# Patient Record
Sex: MALE | Race: WHITE | NOT HISPANIC OR LATINO | Employment: OTHER | URBAN - METROPOLITAN AREA
[De-identification: names, ages, dates, MRNs, and addresses within clinical notes are randomized per-mention and may not be internally consistent; named-entity substitution may affect disease eponyms.]

---

## 2019-07-17 NOTE — TELEPHONE ENCOUNTER
FUTURE VISIT INFORMATION      FUTURE VISIT INFORMATION:    Date: 8/27/19    Time: 3:30 pm ENT  2:30 pm Audiology    [place] [facility] [place]ERRAL INFORMATION:    Referring provider:  Dr. Kwadwo Landaverde    Referring providers clinic:  [facility]    Reason for visit/diagnosis  Bilateral hearing loss, hearing aids do not work    RECORDS REQUESTED FROM:       Clinic name Comments Records Status Imaging Status   [facility] Last office visit-6/5/19-Dr. Kwadwo Landaverde  Referral by phone encounter-7/10/19 Care Everywhere          [facility] MRI Head-1/9/14 Report in Care Everywhere PACS   [facility] Office Visit-1/23/19 Hearing aids Care Everywhere                  Action    Action Taken 7/17/2019 -9:05 AM-Faxed request for imaging to [facility].  PB  7/17/2019-1:30pm-Patient needs REBECCA, per [facility].  PB  7/24/19-Called patient and LVM to return call.  [phone]  PB  8/5/19-Pt called.  I emailed an REBECCA to him.  [email or web address]  PB  8/9/2019 -11:09 AM-Received images from [facility]. Archived to PACS PB         Action    Action Taken 7/17/2019-9:09 AM-Faxed request for records to [facility].  PB  7/17/2019 -3:17 PM Received fax from [facility].  They do not have any records of this patient.  PB

## 2019-07-23 ENCOUNTER — DOCUMENTATION ONLY (OUTPATIENT)
Dept: FAMILY MEDICINE | Facility: CLINIC | Age: 74
End: 2019-07-23

## 2019-07-23 NOTE — PROGRESS NOTES
Visit to the client's home for annual health risk assessment.  An  was not needed.    Current situation/living environment  Mike is a 73 year old male who lives with his spouse and adult daughter, as well as her family, in a single family home in [place]. He is a member of the [employer] community, [employer]. 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 He use to go to [facility] back in 2013/2014, but changed clinics to [facility], as finally [facility] near [address]. He came back on [facility] in July 2019, and would like to re-establish at [facility] to keep me as a .    Activities of daily living (ADL)/instrumental activities of daily living (IADL) and functional issues  Client needs help with the following ADL's: None  Client needs help with the following IADL's: housekeeping  Client states he is unable to perform the above due to Chronic knee pain, more in the right than the left. He states that it shoots down his ankle and up to his hip.      Health concerns for today  No major concerns today. He continues to have ongoing bilateral knee pain, and more in the right one. He is due for a Medicare Wellness Visit, and would like to schedule that in September of 2019. I called the clinic to schedule but was not able to, as they did not have their schedules yet. Will have Mike call in August to set up.  Has patient fallen 2 or more times in the last year? No  Has patient fallen with injury in the last year? No    Cognition/mental health  Denies any mental health issues or memory impairment. He has had some stressful events occur in the last year, but reports that he feels stable and has no concerns. He relies on his nichole and prayer.    STARS/Med Adherence  Client is non-compliant with the following quality measures: Colon cancer screening  Comments: education efforts-Discussed FIT testing. Will discuss with MD at  [facility]. Also discussed Advance Directives today and encouraged him to fill one out. Will consider this.    Client's Plan of Care consists of:  No supports needed at this time    Monika Ely, MANNIE  [phone]

## 2019-08-26 DIAGNOSIS — H91.90 HEARING LOSS, UNSPECIFIED HEARING LOSS TYPE, UNSPECIFIED LATERALITY: Primary | ICD-10-CM

## 2019-08-27 ENCOUNTER — OFFICE VISIT (OUTPATIENT)
Dept: AUDIOLOGY | Facility: CLINIC | Age: 74
End: 2019-08-27
Attending: OTOLARYNGOLOGY
Payer: COMMERCIAL

## 2019-08-27 ENCOUNTER — OFFICE VISIT (OUTPATIENT)
Dept: OTOLARYNGOLOGY | Facility: CLINIC | Age: 74
End: 2019-08-27
Payer: COMMERCIAL

## 2019-08-27 ENCOUNTER — PRE VISIT (OUTPATIENT)
Dept: OTOLARYNGOLOGY | Facility: CLINIC | Age: 74
End: 2019-08-27

## 2019-08-27 VITALS
HEART RATE: 54 BPM | WEIGHT: 236 LBS | SYSTOLIC BLOOD PRESSURE: 148 MMHG | BODY MASS INDEX: 31.97 KG/M2 | DIASTOLIC BLOOD PRESSURE: 92 MMHG | HEIGHT: 72 IN

## 2019-08-27 DIAGNOSIS — H90.3 BILATERAL SENSORINEURAL HEARING LOSS: Primary | ICD-10-CM

## 2019-08-27 DIAGNOSIS — H91.90 HEARING LOSS, UNSPECIFIED HEARING LOSS TYPE, UNSPECIFIED LATERALITY: ICD-10-CM

## 2019-08-27 DIAGNOSIS — H90.3 SENSORY HEARING LOSS, BILATERAL: Primary | ICD-10-CM

## 2019-08-27 DIAGNOSIS — H93.13 TINNITUS, BILATERAL: ICD-10-CM

## 2019-08-27 ASSESSMENT — PAIN SCALES - GENERAL: PAINLEVEL: NO PAIN (1)

## 2019-08-27 ASSESSMENT — MIFFLIN-ST. JEOR: SCORE: 1838

## 2019-08-27 NOTE — PATIENT INSTRUCTIONS
1.  You were seen in the ENT Clinic today by Dr. Nissen.  If you have any questions or concerns after your appointment, please call [phone]. Press option #1 for scheduling related needs. Press option #3 for Nurse advice.    2.  Plan is to return to clinic in 3 years with an Audiogram and Tympanogram (hearing test) prior to appointment, or sooner with any concerns.      Laura Foss LPN  [facility]  [phone]

## 2019-08-27 NOTE — PROGRESS NOTES
AUDIOLOGY REPORT    SUMMARY: Audiology visit completed. See audiogram for results.      RECOMMENDATIONS: Follow-up with ENT.    Angi Mabry.  Licensed Audiologist  [place] # [id number]

## 2019-08-27 NOTE — NURSING NOTE
"Chief Complaint   Patient presents with     Consult     bilateral hearing loss, new hearing aids      Blood pressure (!) 148/92, pulse 54, height 1.82 m (5' 11.65\"), weight 107 kg (236 lb).  '  Gino Patino LPN    "

## 2019-08-27 NOTE — LETTER
8/27/2019       RE: Mike Santiago  [address]   [place] [address]     Dear Colleague,    Thank you for referring your patient, Mike Santiago, to the [facility] at [facility]. Please see a copy of my visit note below.    Dear Emmanuel Flores:    I had the pleasure of meeting Mike Santiago in consultation today at the [facility] at your request.    CHIEF COMPLAINT: Hearing loss    HISTORY OF PRESENT ILLNESS: Patient is a 73-year-old in today for assessment of his ears and hearing.  He has had a question of hearing loss and hearing issues for several years.  He obtained his first set of hearing aids probably over 12 years ago.  His current ones are about 7 years old.  They are no longer working.  He feels his hearing is symmetrical.  He has had bilateral tinnitus for many years as well.  He has not had any pain or drainage, no adult ear infections.  Denies any dizziness.  Further denies any dysphagia, hoarseness, facial paresthesias.  He has been around a lot of noise being in the [employer] business, using chain saws, jackhammers, etc.    ALLERGIES:    Allergies   Allergen Reactions     Norfloxacin Dermatitis     Rash     Shrimp        HABITS: Social History    Substance and Sexual Activity      Alcohol use: Yes        Comment: Some     History   Smoking Status     Former Smoker     Packs/day: 0.00     Years: 5.00     Types: Cigarettes     Start date: 1/1/1962     Quit date: 1/1/1966   Smokeless Tobacco     Never Used         PAST MEDICAL HISTORY: Please see today's intake form (for the remainder of the PMH) which I reviewed and signed.  Past Medical History:   Diagnosis Date     Conductive hearing loss      Hearing problem      Tinnitus        FAMILY HISTORY/SOCIAL HISTORY:   Family History   Problem Relation Age of Onset     Asthma Father      Heart Disease Father      Cerebrovascular Disease Mother       Social  History     Socioeconomic History     Marital status:      Spouse name: Not on file     Number of children: Not on file     Years of education: Not on file     Highest education level: Not on file   Occupational History     Not on file   Social Needs     Financial resource strain: Not on file     Food insecurity:     Worry: Not on file     Inability: Not on file     Transportation needs:     Medical: Not on file     Non-medical: Not on file   Tobacco Use     Smoking status: Former Smoker     Packs/day: 0.00     Years: 5.00     Pack years: 0.00     Types: Cigarettes     Start date: 1962     Last attempt to quit: 1966     Years since quittin.6     Smokeless tobacco: Never Used   Substance and Sexual Activity     Alcohol use: Yes     Comment: Some     Drug use: No     Sexual activity: Yes     Partners: Female     Birth control/protection: Other   Lifestyle     Physical activity:     Days per week: Not on file     Minutes per session: Not on file     Stress: Not on file   Relationships     Social connections:     Talks on phone: Not on file     Gets together: Not on file     Attends Gnosticist service: Not on file     Active member of club or organization: Not on file     Attends meetings of clubs or organizations: Not on file     Relationship status: Not on file     Intimate partner violence:     Fear of current or ex partner: Not on file     Emotionally abused: Not on file     Physically abused: Not on file     Forced sexual activity: Not on file   Other Topics Concern     Not on file   Social History Narrative     Not on file       REVIEW OF SYSTEMS: Patient Supplied Answers to Review of Systems  [facility] ROS 2019   Constitutional Problems with sleep   Musculoskeletal Swollen legs/feet   Hematologic Bleeding problems   Endocrine Frequent urination       The remainder of the 10 point ROS is negative    PHYSICIAL EXAMINATION:  Constitutional: The patient was well-groomed and in no acute distress.    Skin: Warm and pink.  Psychiatric: The patient's affect was calm, cooperative, and appropriate.   Respiratory: Breathing comfortably without stridor or exertion of accessory muscles.  Eyes: Pupils were equal and reactive. Extraocular movement intact.   Head: Normocephalic and atraumatic. No lesions or scars.  Ears: Patient placed under the microscope for microscopic evaluation and cleaning of cerumen which was obscuring full visualization of both TMs. Under high power magnification, the right ear was examined and cleaned of cerumen using curet, alligator forceps, and suction.  After cleaning, TM is fully visualized and has normal position with normal middle ear aeration. The left ear was then cleaned and inspected using microscope, instruments and similar techniques. After cleaning of cerumen, the TM has normal position with normal aeration to middle ear.  Nose: Sinuses were nontender. Anterior rhinoscopy revealed midline septum and absence of purulence or polyps.  Oral Cavity: Normal tongue, floor of mouth, buccal mucosa, and palate. No lesions or masses on inspection or palpation. No abnormal lymph tissue in the oropharynx.   Neck: The parotid is soft without masses. Supple with normal laryngeal and tracheal landmarks.   Lymphatic: There is no palpable lymphadenopathy or other masses in the neck.   Neurologic: Alert and oriented x 3. Cranial nerves III-XI within normal limits. Voice quality normal.  Cerebellar Function Tests:  Grossly normal    Audiogram: Audiogram performed shows a severe bilateral high-frequency sensorineural hearing loss above 1000 Hz.  Discrimination 96% on the right and 84% on the left.  Fairly symmetrical hearing.  Normal type A tympanograms bilaterally.      IMPRESSION AND PLAN:   1. Bilateral sensorineural hearing loss: Discussed protecting his ears from any further noise exposure.  Hearing aids discussed which he will pursue at his discretion in place of location.  Recommend follow-up  in 2 years to monitor, sooner if any significant change or concerns.  2. Bilateral tinnitus: Secondary to sensorineural hearing loss, no treatment needed, monitor.    Thank you very much for the opportunity to participate in the care of your patient.    Rick L Nissen MD

## 2019-08-27 NOTE — PROGRESS NOTES
Dear Emmanuel Flores:    I had the pleasure of meeting Mike Santiago in consultation today at the [facility] at your request.    CHIEF COMPLAINT: Hearing loss    HISTORY OF PRESENT ILLNESS: Patient is a 73-year-old in today for assessment of his ears and hearing.  He has had a question of hearing loss and hearing issues for several years.  He obtained his first set of hearing aids probably over 12 years ago.  His current ones are about 7 years old.  They are no longer working.  He feels his hearing is symmetrical.  He has had bilateral tinnitus for many years as well.  He has not had any pain or drainage, no adult ear infections.  Denies any dizziness.  Further denies any dysphagia, hoarseness, facial paresthesias.  He has been around a lot of noise being in the [employer] business, using chain saws, jackhammers, etc.    ALLERGIES:    Allergies   Allergen Reactions     Norfloxacin Dermatitis     Rash     Shrimp        HABITS: Social History    Substance and Sexual Activity      Alcohol use: Yes        Comment: Some     History   Smoking Status     Former Smoker     Packs/day: 0.00     Years: 5.00     Types: Cigarettes     Start date: 1/1/1962     Quit date: 1/1/1966   Smokeless Tobacco     Never Used         PAST MEDICAL HISTORY: Please see today's intake form (for the remainder of the PMH) which I reviewed and signed.  Past Medical History:   Diagnosis Date     Conductive hearing loss      Hearing problem      Tinnitus        FAMILY HISTORY/SOCIAL HISTORY:   Family History   Problem Relation Age of Onset     Asthma Father      Heart Disease Father      Cerebrovascular Disease Mother       Social History     Socioeconomic History     Marital status:      Spouse name: Not on file     Number of children: Not on file     Years of education: Not on file     Highest education level: Not on file   Occupational History     Not on file   Social Needs     Financial resource strain:  Not on file     Food insecurity:     Worry: Not on file     Inability: Not on file     Transportation needs:     Medical: Not on file     Non-medical: Not on file   Tobacco Use     Smoking status: Former Smoker     Packs/day: 0.00     Years: 5.00     Pack years: 0.00     Types: Cigarettes     Start date: 1962     Last attempt to quit: 1966     Years since quittin.6     Smokeless tobacco: Never Used   Substance and Sexual Activity     Alcohol use: Yes     Comment: Some     Drug use: No     Sexual activity: Yes     Partners: Female     Birth control/protection: Other   Lifestyle     Physical activity:     Days per week: Not on file     Minutes per session: Not on file     Stress: Not on file   Relationships     Social connections:     Talks on phone: Not on file     Gets together: Not on file     Attends Rastafari service: Not on file     Active member of club or organization: Not on file     Attends meetings of clubs or organizations: Not on file     Relationship status: Not on file     Intimate partner violence:     Fear of current or ex partner: Not on file     Emotionally abused: Not on file     Physically abused: Not on file     Forced sexual activity: Not on file   Other Topics Concern     Not on file   Social History Narrative     Not on file       REVIEW OF SYSTEMS: Patient Supplied Answers to Review of Systems   ENT ROS 2019   Constitutional Problems with sleep   Musculoskeletal Swollen legs/feet   Hematologic Bleeding problems   Endocrine Frequent urination       The remainder of the 10 point ROS is negative    PHYSICIAL EXAMINATION:  Constitutional: The patient was well-groomed and in no acute distress.   Skin: Warm and pink.  Psychiatric: The patient's affect was calm, cooperative, and appropriate.   Respiratory: Breathing comfortably without stridor or exertion of accessory muscles.  Eyes: Pupils were equal and reactive. Extraocular movement intact.   Head: Normocephalic and  atraumatic. No lesions or scars.  Ears: Patient placed under the microscope for microscopic evaluation and cleaning of cerumen which was obscuring full visualization of both TMs. Under high power magnification, the right ear was examined and cleaned of cerumen using curet, alligator forceps, and suction.  After cleaning, TM is fully visualized and has normal position with normal middle ear aeration. The left ear was then cleaned and inspected using microscope, instruments and similar techniques. After cleaning of cerumen, the TM has normal position with normal aeration to middle ear.  Nose: Sinuses were nontender. Anterior rhinoscopy revealed midline septum and absence of purulence or polyps.  Oral Cavity: Normal tongue, floor of mouth, buccal mucosa, and palate. No lesions or masses on inspection or palpation. No abnormal lymph tissue in the oropharynx.   Neck: The parotid is soft without masses. Supple with normal laryngeal and tracheal landmarks.   Lymphatic: There is no palpable lymphadenopathy or other masses in the neck.   Neurologic: Alert and oriented x 3. Cranial nerves III-XI within normal limits. Voice quality normal.  Cerebellar Function Tests:  Grossly normal    Audiogram: Audiogram performed shows a severe bilateral high-frequency sensorineural hearing loss above 1000 Hz.  Discrimination 96% on the right and 84% on the left.  Fairly symmetrical hearing.  Normal type A tympanograms bilaterally.      IMPRESSION AND PLAN:   1. Bilateral sensorineural hearing loss: Discussed protecting his ears from any further noise exposure.  Hearing aids discussed which he will pursue at his discretion in place of location.  Recommend follow-up in 2 years to monitor, sooner if any significant change or concerns.  2. Bilateral tinnitus: Secondary to sensorineural hearing loss, no treatment needed, monitor.    Thank you very much for the opportunity to participate in the care of your patient.    Rick L Nissen MD

## 2019-09-25 ENCOUNTER — OFFICE VISIT (OUTPATIENT)
Dept: AUDIOLOGY | Facility: CLINIC | Age: 74
End: 2019-09-25
Attending: OTOLARYNGOLOGY
Payer: COMMERCIAL

## 2019-09-25 DIAGNOSIS — H90.3 BILATERAL SENSORINEURAL HEARING LOSS: ICD-10-CM

## 2019-09-25 DIAGNOSIS — H93.13 TINNITUS, BILATERAL: ICD-10-CM

## 2019-09-25 NOTE — PROGRESS NOTES
AUDIOLOGY REPORT    SUBJECTIVE: Mike Santiago is a 73 year old male who was seen in the Audiology Clinic at the [facility] on 9/25/2019 to discuss concerns with hearing and functional communication difficulties. The patient has been seen previously on 8/27/2019, and results revealed mild sensorineural hearing loss rising to normal hearing sloping to profound sensorineural hearing loss for the right ear and mild sloping to profound sensorineural hearing loss for the left ear. He was medically evaluated and determined to be cleared for bilateral hearing aids by Rick Nissen, M.D. Mike notes difficulty with communication in a variety of listening situations.    OBJECTIVE:  Abuse Screening:  Do you feel unsafe at home or work/school? No  Do you feel threatened by someone? No  Does anyone try to keep you from having contact with others, or doing things outside of your home? No  Physical signs of abuse present? No    The patient is a hearing aid candidate. The patient would like to move forward with a hearing aid evaluation today. Therefore, the patient was presented with different options for amplification to help aid in communication. Discussed styles, levels of technology, iPhone compatibility, and rechargeable options.     The hearing aids mutually chosen were:  BILATERAL: Signia Pure 3Nx Charge and Go  COLOR: Keysha brown  EARMOLD/TIPS: Large closed sleeve domes  CANAL/ LENGTH: 1    ASSESSMENT: Reviewed purchase information and warranty information with the patient. The 45 day trial period was explained to the patient. The patient was given a copy of the [place] Department of Health consumer brochure on purchasing hearing instruments. Patient risk factors have been provided to the patient in writing prior to the sale of the hearing aid per FDA regulation. The risk factors are also available in the User Instructional Booklet to be presented on the day of the  hearing aid fitting. Hearing aids ordered. Hearing aid evaluation completed.    PLAN: Mike is scheduled to return in 2-3 weeks for a hearing aid fitting and programming. Purchase agreement will be completed on that date. Please contact this clinic with any questions or concerns.      Zeus Denton, [facility]  Licensed Audiologist  [place] #[id number]

## 2019-10-29 ENCOUNTER — TRANSFERRED RECORDS (OUTPATIENT)
Dept: HEALTH INFORMATION MANAGEMENT | Facility: CLINIC | Age: 74
End: 2019-10-29

## 2019-11-04 ENCOUNTER — OFFICE VISIT (OUTPATIENT)
Dept: AUDIOLOGY | Facility: CLINIC | Age: 74
End: 2019-11-04
Payer: COMMERCIAL

## 2019-11-04 DIAGNOSIS — H90.3 BILATERAL SENSORINEURAL HEARING LOSS: Primary | ICD-10-CM

## 2019-11-04 NOTE — PROGRESS NOTES
AUDIOLOGY REPORT    SUBJECTIVE: Mike Santiago is a 74 year old male who was seen in the Audiology Clinic at the [facility] for a fitting of binaural Signia Pure Charge and Go 3NX.  The patient has been seen previously on 8/27/2019, and results revealed mild sensorineural hearing loss rising to normal hearing sloping to profound sensorineural hearing loss for the right ear and mild sloping to profound sensorineural hearing loss for the left ear.The patient was given medical clearance to pursue amplification by  Rick Nissen, MD..  OBJECTIVE: The hearing aid conformity evaluation was completed.The hearing aids were placed and they provided a good fit. Real-ear-probe-microphone measurements were completed on the Voxxter system and were a good match to NAL-NL1 target with soft sounds audible, moderate sounds comfortable, and loud sounds below discomfort. UCLs are verified through maximum power output measures and demonstrate appropriate limiting of loud inputs. Mike was oriented to proper hearing aid use, care, cleaning (no water, dry brush), batteries (size RC, insertion/removal, toxicity, low-battery signal), aid insertion/removal, user booklet, warranty information, storage cases, and other hearing aid details. The patient confirmed understanding of hearing aid use and care, and showed proper insertion of hearing aid and batteries while in the office today.Mike reported good volume and sound quality today.   Hearing aids were programmed as follows:  Program 1:Universal  Program 2: Noise    EAR(S) FIT: Bilateral  HEARING AID MODEL NAME: Signia Pure Charge and Go 3NX  HEARING AID STYLE: -in-the-ear behind-the-ear  EARMOLDS/TIP: Large vented sleeve domes  SERIAL NUMBERS: Right: [id number] Left: [id number]  WARRANTY END DATE: 11/23/2022    ASSESSMENT: Binaural Signia Pure Charge and Go 3NX hearing aids were fit today. Verification measures were performed. Mike signed the Hearing Aid  Purchase Agreement and was given a copy, as well as details on his hearing aids. Patient was counseled that exact out of pocket amounts cannot be determined for hearing aid claims being sent to insurance. Any insurance coverage information presented to the patient is an estimate only, and is not a guarantee of payment. Patient has been advised to check with their own insurance.  His insurance will be billed.    PLAN:Mike will return for follow-up in 2-3 weeks for a hearing aid review appointment. Please call this clinic with questions regarding today s appointment.      Angi Marc., CCC-A  Licensed Audiologist  [place] #[id number]

## 2019-11-05 ENCOUNTER — HEALTH MAINTENANCE LETTER (OUTPATIENT)
Age: 74
End: 2019-11-05

## 2019-11-18 ENCOUNTER — OFFICE VISIT (OUTPATIENT)
Dept: FAMILY MEDICINE | Facility: CLINIC | Age: 74
End: 2019-11-18
Payer: COMMERCIAL

## 2019-11-18 ENCOUNTER — RECORDS - HEALTHEAST (OUTPATIENT)
Dept: ADMINISTRATIVE | Facility: OTHER | Age: 74
End: 2019-11-18

## 2019-11-18 VITALS
SYSTOLIC BLOOD PRESSURE: 129 MMHG | WEIGHT: 237 LBS | RESPIRATION RATE: 12 BRPM | HEIGHT: 72 IN | HEART RATE: 64 BPM | TEMPERATURE: 97.3 F | OXYGEN SATURATION: 98 % | DIASTOLIC BLOOD PRESSURE: 82 MMHG | BODY MASS INDEX: 32.1 KG/M2

## 2019-11-18 DIAGNOSIS — B00.1 COLD SORE: ICD-10-CM

## 2019-11-18 DIAGNOSIS — N18.30 CKD (CHRONIC KIDNEY DISEASE) STAGE 3, GFR 30-59 ML/MIN (H): ICD-10-CM

## 2019-11-18 DIAGNOSIS — E78.5 HYPERLIPIDEMIA LDL GOAL <130: ICD-10-CM

## 2019-11-18 DIAGNOSIS — K59.00 CONSTIPATION, UNSPECIFIED CONSTIPATION TYPE: Primary | ICD-10-CM

## 2019-11-18 DIAGNOSIS — I26.99 ACUTE PULMONARY EMBOLISM WITHOUT ACUTE COR PULMONALE, UNSPECIFIED PULMONARY EMBOLISM TYPE (H): ICD-10-CM

## 2019-11-18 DIAGNOSIS — K40.91 UNILATERAL RECURRENT INGUINAL HERNIA WITHOUT OBSTRUCTION OR GANGRENE: ICD-10-CM

## 2019-11-18 DIAGNOSIS — R10.31 RLQ ABDOMINAL PAIN: ICD-10-CM

## 2019-11-18 LAB
ALBUMIN SERPL-MCNC: 3.8 MG/DL (ref 3.3–4.9)
ALP SERPL-CCNC: 61.8 U/L (ref 40–150)
ALT SERPL-CCNC: <15 U/L (ref 0–45)
AST SERPL-CCNC: 21.4 U/L (ref 0–55)
BILIRUB SERPL-MCNC: 0.5 MG/DL (ref 0.2–1.3)
BUN SERPL-MCNC: 17.8 MG/DL (ref 7–21)
CALCIUM SERPL-MCNC: 8.9 MG/DL (ref 8.5–10.1)
CHLORIDE SERPLBLD-SCNC: 110.7 MMOL/L (ref 98–110)
CHOLEST SERPL-MCNC: 226.4 MG/DL (ref 0–200)
CHOLEST/HDLC SERPL: 4.4 {RATIO} (ref 0–5)
CO2 SERPL-SCNC: 29.1 MMOL/L (ref 20–32)
CREAT SERPL-MCNC: 1.2 MG/DL (ref 0.7–1.3)
CREAT UR-MCNC: 176.9 MG/DL
GFR SERPL CREATININE-BSD FRML MDRD: 59.8 ML/MIN/1.7 M2
GLUCOSE SERPL-MCNC: 81.9 MG'DL (ref 70–99)
HDLC SERPL-MCNC: 51.1 MG/DL
LDLC SERPL CALC-MCNC: 153 MG/DL (ref 0–129)
MICROALBUMIN UR-MCNC: 0.56 MG/DL (ref 0–1.99)
MICROALBUMIN/CREAT UR: 3.2 MG/G
POTASSIUM SERPL-SCNC: 4.6 MMOL/DL (ref 3.2–4.6)
PROT SERPL-MCNC: 6.4 G/DL (ref 6.8–8.8)
SODIUM SERPL-SCNC: 143.7 MMOL/L (ref 132–142)
TRIGL SERPL-MCNC: 111.9 MG/DL (ref 0–150)
VLDL CHOLESTEROL: 22.4 MG/DL (ref 7–32)

## 2019-11-18 RX ORDER — POLYETHYLENE GLYCOL 3350 17 G/17G
1 POWDER, FOR SOLUTION ORAL DAILY
Qty: 30 PACKET | Refills: 3 | Status: SHIPPED | OUTPATIENT
Start: 2019-11-18 | End: 2024-01-23

## 2019-11-18 RX ORDER — VALACYCLOVIR HYDROCHLORIDE 1 G/1
2000 TABLET, FILM COATED ORAL 2 TIMES DAILY
Qty: 4 TABLET | Refills: 11 | Status: SHIPPED | OUTPATIENT
Start: 2019-11-18 | End: 2024-01-23

## 2019-11-18 ASSESSMENT — MIFFLIN-ST. JEOR: SCORE: 1835.08

## 2019-11-18 NOTE — PROGRESS NOTES
"This is a 74-year-old male who attends today to reestablish care apparently due to changes in his insurance.  He was last seen at this clinic in 2014.  Since then he has been attending within the [facility] system most recently with a visit in June 2019.  He brings with him a gift card to be signed when he has a health maintenance exam however did not schedule sufficient time for this today.  We have negotiated that we will address acute concerns today and have him follow-up for a longer health maintenance visit.  He is in agreement with that plan.  He is complaining of constipation over the last few weeks.  This is somewhat out of the blue and not a problem he usually has.  He has not noticed any blood in his stools or mucus in the stools.  He has taken some Dulcolax occasionally.  In addition he is aware of a swelling in his right groin which is been going on for about the same amount of time.  He has a history of bilateral inguinal hernia repair many years ago.  He also apparently had an umbilical hernia repaired and feels that this also recurs at times.    Otherwise review of his past medical history reveals that in 2016 he had bilateral pulmonary emboli for which he was started on rivaroxaban.  He has had no recurrence of clots on the anticoagulant.  He also stopped taking pravastatin because some people told him this might harm him.    ROS:  ENT: He is just been recently fitted with hearing aids  Mouth: He had a cold sore on his lower lip for about 4 days which he is treating with a home remedy but for which he would like a medication.    Objective:  /82 (BP Location: Left arm, Patient Position: Sitting, Cuff Size: Adult Large)   Pulse 64   Temp 97.3  F (36.3  C) (Oral)   Resp 12   Ht 1.816 m (5' 11.5\")   Wt 107.5 kg (237 lb)   SpO2 98%   BMI 32.59 kg/m    His blood pressure is satisfactory, he is in no acute distress.  He does have a simple cold sore on his lower lip.  Abdominal exam reveals no " abdominal tenderness nor obvious organomegaly.  He is a scar at the umbilicus and no obvious hernia to my exam today.  He has bilateral inguinal scars.  He has an obvious swelling at the right inguinal region and exam suggests a recurrence of a right inguinal hernia.    His past medical records were reviewed and abstracted to the current record.    Results for orders placed or performed in visit on 11/18/19   Lipid Panel ([facility])     Status: Abnormal   Result Value Ref Range    Cholesterol 226.4 (H) 0.0 - 200.0 mg/dL    Cholesterol/HDL Ratio 4.4 0.0 - 5.0    HDL Cholesterol 51.1 >40.0 mg/dL    LDL Cholesterol Calculated 153 (H) 0 - 129 mg/dL    Triglycerides 111.9 0.0 - 150.0 mg/dL    VLDL Cholesterol 22.4 7.0 - 32.0 mg/dL   Comprehensive Metabolic Panel ([place])     Status: Abnormal   Result Value Ref Range    Albumin 3.8 3.3 - 4.9 mg/dL    Alkaline Phosphatase 61.8 40.0 - 150.0 U/L    ALT <15 0.0 - 45.0 U/L    AST 21.4 0.0 - 55.0 U/L    Bilirubin Total 0.5 0.2 - 1.3 mg/dL    Urea Nitrogen 17.8 7.0 - 21.0 mg/dL    Calcium 8.9 8.5 - 10.1 mg/dL    Chloride 110.7 (H) 98.0 - 110.0 mmol/L    Carbon Dioxide 29.1 20.0 - 32.0 mmol/L    Creatinine 1.2 0.7 - 1.3 mg/dL    Glucose 81.9 70.0 - 99.0 mg'dL    Potassium 4.6 3.2 - 4.6 mmol/dL    Sodium 143.7 (H) 132.0 - 142.0 mmol/L    Protein Total 6.4 (L) 6.8 - 8.8 g/dL    GFR Estimate 59.8 (L) >60.0 mL/min/1.7 m2    GFR Estimate If Black 72.4 >60.0 mL/min/1.7 m2   Microalbumin Creatinine Ratio Random Ur ([facility])     Status: None   Result Value Ref Range    Microalbumin, Urine 0.56 0.00 - 1.99 mg/dL    Creatinine, Urine 176.9 mg/dL    Albumin Urine mg/g Cr 3.2 <=19.9 mg/g         Mike was seen today for new patient.    Diagnoses and all orders for this visit:    Constipation, unspecified constipation type  -     polyethylene glycol (MIRALAX/GLYCOLAX) packet; Take 17 g by mouth daily  -     Comprehensive Metabolic Panel ([place])    RLQ abdominal pain    Unilateral  recurrent inguinal hernia without obstruction or gangrene  -     GENERAL SURG ADULT REFERRAL; Future    Cold sore  -     valACYclovir (VALTREX) 1000 mg tablet; Take 2 tablets (2,000 mg) by mouth 2 times daily for 1 day    Hyperlipidemia LDL goal <130  -     Lipid Panel ([facility])    CKD (chronic kidney disease) stage 3, GFR 30-59 ml/min (H)  -     Comprehensive Metabolic Panel ([place])  -     Microalbumin Creatinine Ratio Random Ur ([facility])    Acute pulmonary embolism without acute cor pulmonale, unspecified pulmonary embolism type (H)      He appears to have a recurrence of a right inguinal hernia.  The chronology may explain the recent constipation also.  I recommended a referral to general surgery.  In addition I prescribed MiraLAX for as needed daily use.    I prescribed valacyclovir for cold sore.    He will schedule a physical for within the next 2 weeks and in advance of that will obtain labs today which we can address at that point.  I have explained that given his risk factors we will be recommending he recommence a statin.    Total visit time was 30 mins, all of which was face to face MD time, and over 50% of this time was spent in counseling and coordination of care.

## 2019-11-18 NOTE — LETTER
November 20, 2019      Mike Santiago  [address]   [place] [address]        Dear Mike,    Your labs show:   No protein loss in urine - good   Liver function normal - good   Borderline kidney function - needs to be monitored   High bad cholesterol LDL - the calculation below estimates that you have a risk of 1 in 4 of having a heart attack in the next 10 years.  Therefore all efforts to lower your risk are recommended - these include keeping your blood pressure low (it was), taking a blood thinner (you are) and lowering your cholesterol.  So we would strongly recommend that you start taking a statin medication again.  Happy to discuss further next time I see you - regards.    The 10-year ASCVD risk score (Mary MARCIAL Jr., et al., 2013) is: 27.3%     Values used to calculate the score:       Age: 74 years       Sex: Male       Is Non- : No       Diabetic: No       Tobacco smoker: No       Systolic Blood Pressure: 129 mmHg       Is BP treated: No       HDL Cholesterol: 51.1 mg/dL       Total Cholesterol: 226.4 mg/dL   Please see below for your test results.    Resulted Orders   Lipid Panel ([facility])   Result Value Ref Range    Cholesterol 226.4 (H) 0.0 - 200.0 mg/dL    Cholesterol/HDL Ratio 4.4 0.0 - 5.0    HDL Cholesterol 51.1 >40.0 mg/dL    LDL Cholesterol Calculated 153 (H) 0 - 129 mg/dL    Triglycerides 111.9 0.0 - 150.0 mg/dL    VLDL Cholesterol 22.4 7.0 - 32.0 mg/dL   Comprehensive Metabolic Panel ([place])   Result Value Ref Range    Albumin 3.8 3.3 - 4.9 mg/dL    Alkaline Phosphatase 61.8 40.0 - 150.0 U/L    ALT <15 0.0 - 45.0 U/L    AST 21.4 0.0 - 55.0 U/L    Bilirubin Total 0.5 0.2 - 1.3 mg/dL    Urea Nitrogen 17.8 7.0 - 21.0 mg/dL    Calcium 8.9 8.5 - 10.1 mg/dL    Chloride 110.7 (H) 98.0 - 110.0 mmol/L    Carbon Dioxide 29.1 20.0 - 32.0 mmol/L    Creatinine 1.2 0.7 - 1.3 mg/dL    Glucose 81.9 70.0 - 99.0 mg'dL    Potassium 4.6 3.2 - 4.6 mmol/dL    Sodium 143.7 (H) 132.0 -  142.0 mmol/L    Protein Total 6.4 (L) 6.8 - 8.8 g/dL    GFR Estimate 59.8 (L) >60.0 mL/min/1.7 m2    GFR Estimate If Black 72.4 >60.0 mL/min/1.7 m2   Microalbumin Creatinine Ratio Random Ur ([facility])   Result Value Ref Range    Microalbumin, Urine 0.56 0.00 - 1.99 mg/dL    Creatinine, Urine 176.9 mg/dL    Albumin Urine mg/g Cr 3.2 <=19.9 mg/g    Narrative    Test performed by:  [facility]  [address] [place] [address]  Microalbumin, Random Urine  <2.0 mg/dL . . . . . . . . Normal  3.0-30.0 mg/dL . . . . . . Microalbuminuria  >30.0 mg/dL . . . . . .  . Clinical Proteinuria  Microalbumin/Creatinine Ratio, Random Urine  <20 mg/g . . . . .. . . . Normal   mg/g . . . . . . . Microalbuminuria  >300 mg/g . . . . . . . . Clinical Proteinuria       If you have any questions, please call the clinic to make an appointment.    Sincerely,    Mike Samano MD

## 2019-11-19 NOTE — RESULT ENCOUNTER NOTE
Hello Mr Santiaog, your labs show:  No protein loss in urine - good  Liver function normal - good  Borderline kidney function - needs to be monitored  High bad cholesterol LDL - the calculation below estimates that you have a risk of 1 in 4 of having a heart attack in the next 10 years.  Therefore all efforts to lower your risk are recommended - these include keeping your blood pressure low (it was), taking a blood thinner (you are) and lowering your cholesterol.  So we would strongly recommend that you start taking a statin medication again.  Happy to discuss further next time I see you - regards, Mike Samano MD    The 10-year ASCVD risk score (Mary MARCIAL Jr., et al., 2013) is: 27.3%    Values used to calculate the score:      Age: 74 years      Sex: Male      Is Non- : No      Diabetic: No      Tobacco smoker: No      Systolic Blood Pressure: 129 mmHg      Is BP treated: No      HDL Cholesterol: 51.1 mg/dL      Total Cholesterol: 226.4 mg/dL

## 2019-11-19 NOTE — PATIENT INSTRUCTIONS
[facility]   Phone: [phone]  Fax: [phone]     Referral, demographics and office visit have been faxed to [phone], they will contact patient to schedule.    Lucie Botello

## 2019-11-20 ENCOUNTER — AMBULATORY - HEALTHEAST (OUTPATIENT)
Dept: SURGERY | Facility: CLINIC | Age: 74
End: 2019-11-20

## 2019-11-20 DIAGNOSIS — K40.91 UNILATERAL RECURRENT INGUINAL HERNIA WITHOUT OBSTRUCTION OR GANGRENE: ICD-10-CM

## 2019-11-22 NOTE — PROGRESS NOTES
AUDIOLOGY REPORT    SUBJECTIVE:Mike Santiago is a 74 year old male who was seen in the Audiology Clinic at the [facility] on 11/25/2019  for a follow-up check regarding the fitting of new hearing aids. The patient has been seen previously on 8/27/2019, and results revealed mild sensorineural hearing loss rising to normal hearing sloping to profound sensorineural hearing loss for the right ear and mild sloping to profound sensorineural hearing loss for the left ear. The patient has been seen previously in this clinic and was fit with  binaural Signia Pure Charge and Go 3NX on 11/4/2019.  Mike reports good sound quality with the hearig aids.   He doesn't use them when he is working with loud noise. 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     Based on patient report, the following changes were made: He was given a third program that is quieter.    Reviewed 45 day trial period, care, cleaning (no water, dry brush), batteries (size RC) insertion/removal, toxicity, low-battery signal), aid insertion/removal, volume adjustment (if applicable), user booklet, warranty information, storage cases, and other hearing aid details.  The Placelingia bailey was downloaded, and his hearing aids were paired to his IPhone     No charge visit today (in warranty hearing aid check).     ASSESSMENT: A follow-up appointment for hearing aid fitting was completed today. IOI-HA administered today. Changes to hearing aid was completed as outlined above.     PLAN:Mike will return for follow-up as needed, or at least every 4-6 months for cleaning and  assessment of hearing aid.  . Please call this clinic with any questions regarding today s appointment.    Zeus Marc, CCC-A  Licensed Audiologist  [place] #[id number]

## 2019-11-25 ENCOUNTER — OFFICE VISIT (OUTPATIENT)
Dept: FAMILY MEDICINE | Facility: CLINIC | Age: 74
End: 2019-11-25
Payer: COMMERCIAL

## 2019-11-25 ENCOUNTER — OFFICE VISIT (OUTPATIENT)
Dept: AUDIOLOGY | Facility: CLINIC | Age: 74
End: 2019-11-25
Payer: COMMERCIAL

## 2019-11-25 VITALS
OXYGEN SATURATION: 97 % | HEIGHT: 72 IN | TEMPERATURE: 98 F | RESPIRATION RATE: 12 BRPM | WEIGHT: 236 LBS | SYSTOLIC BLOOD PRESSURE: 127 MMHG | HEART RATE: 63 BPM | BODY MASS INDEX: 31.97 KG/M2 | DIASTOLIC BLOOD PRESSURE: 83 MMHG

## 2019-11-25 DIAGNOSIS — I26.99 ACUTE PULMONARY EMBOLISM WITHOUT ACUTE COR PULMONALE, UNSPECIFIED PULMONARY EMBOLISM TYPE (H): ICD-10-CM

## 2019-11-25 DIAGNOSIS — D68.2 FACTOR II DEFICIENCY (H): ICD-10-CM

## 2019-11-25 DIAGNOSIS — E78.5 HYPERLIPIDEMIA LDL GOAL <130: ICD-10-CM

## 2019-11-25 DIAGNOSIS — H90.3 BILATERAL SENSORINEURAL HEARING LOSS: Primary | ICD-10-CM

## 2019-11-25 DIAGNOSIS — Z01.818 PREOP GENERAL PHYSICAL EXAM: Primary | ICD-10-CM

## 2019-11-25 LAB
HCT VFR BLD AUTO: 43.9 % (ref 40–53)
HEMOGLOBIN: 14.3 G/DL (ref 13.3–17.7)
MCH RBC QN AUTO: 32.1 PG (ref 26.5–35)
MCHC RBC AUTO-ENTMCNC: 32.6 G/DL (ref 32–36)
MCV RBC AUTO: 98.6 FL (ref 78–100)
PLATELET # BLD AUTO: 272 K/UL (ref 150–450)
RBC # BLD AUTO: 4.5 M/UL (ref 4.4–5.9)
WBC # BLD AUTO: 6.9 K/UL (ref 4–11)

## 2019-11-25 RX ORDER — PRAVASTATIN SODIUM 10 MG
10 TABLET ORAL AT BEDTIME
Qty: 90 TABLET | Refills: 3 | Status: SHIPPED | OUTPATIENT
Start: 2019-11-25 | End: 2020-12-04

## 2019-11-25 ASSESSMENT — MIFFLIN-ST. JEOR: SCORE: 1832.93

## 2019-11-25 NOTE — PATIENT INSTRUCTIONS
PREOP performed.    When surgery is scheduled, STOP XARELTO 24 hours before surgery.  If no complications, RESTART 24 HOURS after surgery.      Personalized Prevention Plan  You are due for the preventive services outlined below.  Your care team is available to assist you in scheduling these services.  If you have already completed any of these items, please share that information with your care team to update in your medical record.  Health Maintenance Due   Topic Date Due     Discuss Advance Care Planning  1945     Zoster (Shingles) Vaccine (1 of 2) 10/30/1993     Annual Wellness Visit  10/30/2008     Pneumococcal Vaccine (2 of 2 - PCV13) 08/20/2013     Flu Vaccine (1) 09/01/2019     MEDICARE PERSONAL PREVENTIVE SERVICES PLAN - IMMUNIZATIONS     Here are your recommended immunizations.  Take this home for your reference.                                                    IMMUNIZATIONS Description Recommend today?     Influenza (Flu shot) Prevents flu; should get every year Recommeded and patient declined.    PCV 13 Pneumonia vaccination; you get it once Recommeded and patient declined.    PPSV 23 Second pneumonia vaccination; usually get it 1 year after PCV 13 No; is up to date.   Zoster (Shingles) Prevents shingles; you get it once  (Check with Part D insurance for coverage, must receive at a pharmacy, not clinic) Recommended and patient is informed of where to get   Tetanus Prevents tetanus; once every 10 years No: is not indicated today       Presurgery Checklist  You are scheduled to have surgery. The healthcare staff will try to make your stay comfortable. Use the guidelines below to remind yourself what to do before surgery. Be sure to follow any specific pre-op instructions from your surgeon or nurse.   Preparing for Surgery  Ask your surgeon if you ll need a blood transfusion during surgery and if so, how to prepare for it. In some cases, you can donate blood before surgery. If needed, this blood can  be given back (transfused) to you during or after surgery.  If you are having abdominal surgery, ask what you need to do to clear your bowel.  Tell your surgeon if you have allergies to any medications or foods.  Arrange for an adult family member or friend to drive you home after surgery. If possible, have someone ready to help you at home as you recover.  Call the surgeon if you get a cold, fever, sore throat, diarrhea, or other health problem just before surgery. Your surgeon can decide whether or not to postpone the surgery.  Medications  Tell your surgeon about all medications you take, including prescription and over-the-counter products such as herbal remedies and vitamins. Ask if you should continue taking them.  If you take ibuprofen, naproxen, or  blood thinners  such as aspirin, clopidogrel (Plavix), or warfarin (Coumadin), ask your surgeon whether you should stop taking them and how long before surgery you should stop.  You may be told to take antibiotics just before surgery to prevent infection. If so, follow instructions carefully on how to take them.  If you are told to take medications called anticoagulants to prevent blood clots after surgery, be sure to follow the instructions on how to take them.  Stop Smoking  If you smoke, healing may take longer. So at least 2 week(s) before surgery, stop smoking.  Bathing or Showering Before Surgery  If instructed, wash with antibacterial soap. Afterward, do not use lotions or powders.  If you are having surgery on the head, you may be asked to shampoo with antibacterial soap. Follow instructions for doing so.  Do Not Remove Hair from the Surgery Site  Do not shave hair from the incision site, unless you are given specific instructions to do so. Usually, if hair needs to be removed, it will be done at the hospital right before surgery.  Don t Eat or Drink  Your doctor will tell you when to stop eating and drinking. If you do not follow your doctor's  instructions, your procedure may be postponed or rescheduled for another day.  If your surgeon tells you to continue any medications, take them with small sips of water.  You can brush your teeth and rinse your mouth, but don t swallow any water.  Day of Surgery  Do not wear makeup. Do not use perfume, deodorant, or hairspray. Remove nail polish and artificial nails.  Leave jewelry (including rings), watches, and other valuables at home.  Be sure to bring health insurance cards or forms and a photo ID.  Bring a list of your medications (include the name, dose, how often you take them, and the time last dose was taken).  Arrive on time at the hospital or surgery facility.

## 2019-11-25 NOTE — NURSING NOTE
VISION:  Vision: Both eye 10/12.5, Right eye 10/16, Left eye 10/16, with no corrective lens    Rula Calixto CMA

## 2019-11-25 NOTE — PROGRESS NOTES
[facility] [address] [place] [address]  Phone: [phone]  Fax: [phone]    11/25/2019    Adult PRE-OP Evaluation:    Mike Santiago, 1945 presents for pre-operative evaluation and assessment as requested by Dr. Whtie Surgery, prior to undergoing surgery/procedure for treatment of  Recurrence of Right Inguinal Hernia .    Proposed procedure: Right Hernia Repair    Date of Surgery/ Procedure: [facility] Hernia repair within 30 days     Primary Physician: Emmanuel Dumont  Type of Anesthesia Anticipated: General  History of anesthesia complications: NONE  History of  abnormal bleeding: YES: Personal HX - on long term anticoagulation    History of blood transfusions: NO  Patient has a Health Care Directive or Living Will:  NO    Preoperative Questions   1. NO - Do you have a history of heart attack, stroke, stent, bypass or surgery on an artery in the head, neck, heart or legs?  2. NO - Do you ever have any pain or discomfort in your chest?  3. NO - Have you ever had a severe pain across the front of your chest lasting for half an hour or more?  4. NO - Do you have a history of Congestive Heart Failure?  5. NO - Are you troubled by shortness of breath when: walking on the level/ up a slight hill/ at night?  6. NO - Does your chest ever sound wheezy or whistling?  7. NO - Do you currently have a cold, bronchitis or other respiratory infection?  8. NO - Have you had a cold, bronchitis or other respiratory infection within the last 2 weeks?  9. NO - Do you usually have a cough?  10. NO - Do you sometimes get pains in the calves of your legs when you walk?  11. YES - Do you or anyone in your family have previous history of blood clots? YES - coagulation abnormality with prior pulmonary emboli on long term DOAC  12. NO - Do you or does anyone in your family have a serious bleeding problem such as prolonged bleeding following surgeries or cuts?  13. NO - Have you ever had problems with  anemia or been told to take iron pills?  14. NO - Have you had any abnormal blood loss such as black, tarry or bloody stools, or abnormal vaginal bleeding?  15. NO - Have you ever had a blood transfusion?  16. NO - Have you or any of your relatives ever had problems with anesthesia?  17. NO - Do you have sleep apnea, excessive snoring or daytime drowsiness?  18. NO - Do you have any prosthetic heart valves?  19. NO - Do you have prosthetic joints?  20. NO - Is there any chance that you may be pregnant?    Patient Active Problem List   Diagnosis     Hyperlipidemia LDL goal <130     Health Care Home     Cataract of both eyes     Presbycusis of both ears     Tinnitus     Obesity     Factor II deficiency (H)     Acute pulmonary embolism (H)       rivaroxaban ANTICOAGULANT (XARELTO) 15 MG TABS tablet, Take by mouth daily (with dinner)  rivaroxaban ANTICOAGULANT (XARELTO) 20 MG TABS tablet, Take 20 mg by mouth daily  VITAMIN E, 1 tablet daily  FISH OIL,   polyethylene glycol (MIRALAX/GLYCOLAX) packet, Take 17 g by mouth daily  valACYclovir (VALTREX) 1000 mg tablet, Take 2 tablets (2,000 mg) by mouth 2 times daily for 1 day    No current facility-administered medications on file prior to visit.       OTC products: None, except as noted above    Allergies   Allergen Reactions     Norfloxacin Dermatitis     Rash     Shrimp      Latex Allergy: NO    Social History     Socioeconomic History     Marital status:      Spouse name: None     Number of children: None     Years of education: None     Highest education level: None   Occupational History     None   Social Needs     Financial resource strain: None     Food insecurity:     Worry: None     Inability: None     Transportation needs:     Medical: None     Non-medical: None   Tobacco Use     Smoking status: Former Smoker     Packs/day: 0.00     Years: 5.00     Pack years: 0.00     Types: Cigarettes     Start date: 1/1/1962     Last attempt to quit: 1/1/1966     Years  "since quittin.9     Smokeless tobacco: Never Used   Substance and Sexual Activity     Alcohol use: Yes     Comment: Some     Drug use: No     Sexual activity: Yes     Partners: Female     Birth control/protection: Other   Lifestyle     Physical activity:     Days per week: None     Minutes per session: None     Stress: None   Relationships     Social connections:     Talks on phone: None     Gets together: None     Attends Zoroastrian service: None     Active member of club or organization: None     Attends meetings of clubs or organizations: None     Relationship status: None     Intimate partner violence:     Fear of current or ex partner: None     Emotionally abused: None     Physically abused: None     Forced sexual activity: None   Other Topics Concern     None   Social History Narrative     None       REVIEW OF SYSTEMS:   Constitutional, HEENT, cardiovascular, pulmonary, GI, , musculoskeletal, neuro, skin, endocrine and psych systems are negative, except as otherwise noted.    EXAM:     Patient Vitals for the past 24 hrs:   BP Temp Temp src Pulse Resp SpO2 Height Weight   19 1400 127/83 98  F (36.7  C) Oral 63 12 97 % 1.82 m (5' 11.65\") 107 kg (236 lb)     Body mass index is 32.32 kg/m .  GENERAL: healthy, alert and no distress  EYES: Eyes grossly normal to inspection, extraocular movements - intact, and PERRL  HENT: ear canals- normal; TMs- normal; Nose- normal; Mouth- no ulcers, no lesions  NECK: no tenderness, no adenopathy, no asymmetry, no masses, no stiffness; thyroid- normal to palpation  RESP: lungs clear to auscultation - no rales, no rhonchi, no wheezes  CV: regular rates and rhythm, normal S1 S2, no S3 or S4 and no murmur, no click or rub -  ABDOMEN: soft, no tenderness, no  hepatosplenomegaly, no masses, normal bowel sounds  MS: extremities- no gross deformities noted, no edema  SKIN: no suspicious lesions, no rashes  NEURO: strength and tone- normal, sensory exam- grossly normal, " mentation- intact, speech- normal, reflexes- symmetric  BACK: no CVA tenderness, no paralumbar tenderness  PSYCH: Alert and oriented times 3; speech- coherent , normal rate and volume; able to articulate logical thoughts  LYMPHATICS: ant. cervical- normal, post. cervical- normal    DIAGNOSTICS:      EKG: appears normal, NSR, normal axis, normal intervals, no acute ST/T changes c/w ischemia, no LVH by voltage criteria, unchanged from previous tracings  Serum Potassium  Serum Creatinine    RISK ASSESSMENT:     Cardiovascular Risk:  -Patient is able to participate in strenuous activities without chest pain.  -The patient does not have chest pain with exertion.  -Patient does not have a history of congestive heart failure.    -The patient does not have a history of stroke and does not have a history of valvular disease.  ECHO July 2019, normal ejection fraction, no significant valvular disease.      Pulmonary Risk:  -In terms of risk factors for pulmonary complication, the patient is older then 60    Perioperative Complications:  -The patient has a history of bleeding or clotting problems in the past.    -The patient has not had complications from surgeries.    -The patient does not have a family history of any anesthesia or surgical complications.      IMPRESSION:   Reason for surgery/procedure: RIGHT HERNIA REPAIR    The proposed surgical procedure is considered INTERMEDIATE risk.    For above listed surgery and anesthesia:   Patient is at moderate risk for surgery/procedure and perioperative/procedure complications.    RECOMMENDATIONS:     Labs:  Hgb, K+, Creatininge and EKG    Fasting:  NPO for 12 hours prior to surgery    Preop Plan:  --Approval given to proceed with proposed procedure, without further diagnostic evaluation  --Patient is currently taking    Anticoagulant or Antiplatelet Medication Use  XARELTO: Bleeding risk is at least moderate for this procedure and rivaroxaban (Xarelto) should be stopped at  least 24 hours prior to procedure.  RESTART XARELTO 24 hours after the procedure if approved by surgeon.        Medications:  Patient should hold their regular medications the morning of surgery unless otherwise instructed.          Mike Samano MD    Please contact our office if there are any further questions or information required about this patient.

## 2019-11-25 NOTE — LETTER
November 26, 2019      Mike Santiago  [address]   [place] [address]        Dear Mike,    Complete blood count is normal - as expected, good - OK to proceed with surgery!   Please see below for your test results.    Resulted Orders   CBC with Plt (P )   Result Value Ref Range    WBC 6.9 4.0 - 11.0 K/uL    RBC 4.5 4.4 - 5.9 M/uL    Hemoglobin 14.3 13.3 - 17.7 g/dL    Hematocrit 43.9 40.0 - 53.0 %    MCV 98.6 78.0 - 100.0 fL    MCH 32.1 26.5 - 35.0    MCHC 32.6 32.0 - 36.0 g/dL    Platelets 272.0 150.0 - 450.0 K/uL       If you have any questions, please call the clinic to make an appointment.    Sincerely,    Mike Samano MD

## 2019-11-26 ENCOUNTER — SURGERY - HEALTHEAST (OUTPATIENT)
Dept: SURGERY | Facility: CLINIC | Age: 74
End: 2019-11-26

## 2019-11-26 ENCOUNTER — OFFICE VISIT - HEALTHEAST (OUTPATIENT)
Dept: SURGERY | Facility: CLINIC | Age: 74
End: 2019-11-26

## 2019-11-26 ENCOUNTER — TRANSFERRED RECORDS (OUTPATIENT)
Dept: HEALTH INFORMATION MANAGEMENT | Facility: CLINIC | Age: 74
End: 2019-11-26

## 2019-11-26 DIAGNOSIS — K42.9 RECURRENT UMBILICAL HERNIA: ICD-10-CM

## 2019-11-26 DIAGNOSIS — K40.90 UNILATERAL INGUINAL HERNIA WITHOUT OBSTRUCTION OR GANGRENE, RECURRENCE NOT SPECIFIED: ICD-10-CM

## 2019-11-26 DIAGNOSIS — K40.91 UNILATERAL RECURRENT INGUINAL HERNIA WITHOUT OBSTRUCTION OR GANGRENE: ICD-10-CM

## 2019-12-16 ENCOUNTER — ANESTHESIA - HEALTHEAST (OUTPATIENT)
Dept: SURGERY | Facility: AMBULATORY SURGERY CENTER | Age: 74
End: 2019-12-16

## 2019-12-16 ASSESSMENT — MIFFLIN-ST. JEOR: SCORE: 1807.41

## 2019-12-17 ENCOUNTER — TRANSFERRED RECORDS (OUTPATIENT)
Dept: HEALTH INFORMATION MANAGEMENT | Facility: CLINIC | Age: 74
End: 2019-12-17

## 2019-12-17 ENCOUNTER — SURGERY - HEALTHEAST (OUTPATIENT)
Dept: SURGERY | Facility: AMBULATORY SURGERY CENTER | Age: 74
End: 2019-12-17

## 2019-12-31 ENCOUNTER — OFFICE VISIT - HEALTHEAST (OUTPATIENT)
Dept: SURGERY | Facility: CLINIC | Age: 74
End: 2019-12-31

## 2019-12-31 ENCOUNTER — TRANSFERRED RECORDS (OUTPATIENT)
Dept: HEALTH INFORMATION MANAGEMENT | Facility: CLINIC | Age: 74
End: 2019-12-31

## 2019-12-31 DIAGNOSIS — Z48.89 POSTOPERATIVE VISIT: ICD-10-CM

## 2020-02-16 ENCOUNTER — HEALTH MAINTENANCE LETTER (OUTPATIENT)
Age: 75
End: 2020-02-16

## 2020-03-18 ENCOUNTER — TELEPHONE (OUTPATIENT)
Dept: AUDIOLOGY | Facility: CLINIC | Age: 75
End: 2020-03-18

## 2020-03-18 NOTE — TELEPHONE ENCOUNTER
Spoke with patient and the hearing aids are working fine. He scheduled the appointment on 3/23/2020 for his routine 6 month check. He will call to reschedule for after 4/12/2020.      Zeus Denton, [facility]  Licensed Audiologist  [place] #[id number]

## 2020-04-28 ENCOUNTER — TELEPHONE (OUTPATIENT)
Dept: FAMILY MEDICINE | Facility: CLINIC | Age: 75
End: 2020-04-28

## 2020-04-28 NOTE — LETTER
April 28, 2020      Mike Santiago  [address]   [place] [address]        Dear Mike,      We tried reaching you by phone but were unable to connect with you. We are reaching out to see how you are doing. This is a very stressful time in the world, which can cause an increase in personal stress and anxiety.     Our clinic is open.  We are here for you and are ready to meet all of your healthcare needs.  We have delayed preventive care until July.  We want everyone who can to stay home during this time for their health and the health of all.  We are now having most visits over the phone, but will see people in person if your doctor agrees that it is necessary.  We also will have video visits starting on April 6, 2020.      Call us with any questions or concerns you may have, and know that we are all in this together.       Sincerely,     Your team at [facility]  [phone]      Sincerely,    Emmanuel Dumont MD

## 2020-04-28 NOTE — TELEPHONE ENCOUNTER
Reached out to patient during COVID19 Clinic outreach. Reassured patient that [facility] is still open and has started implementing phone and video appointments to help patient remain safe at home.     Patient reports the following concerns: No Answer, letter sent      Offered MyChart. Patient already has MyChart.    Itzel Richards, [facility]

## 2020-05-14 ENCOUNTER — DOCUMENTATION ONLY (OUTPATIENT)
Dept: FAMILY MEDICINE | Facility: CLINIC | Age: 75
End: 2020-05-14

## 2020-05-14 NOTE — PROGRESS NOTES
Telephone call for annual health risk assessment.  An  was not needed.    Current situation/living environment  Mike is a 74 year old male who lives with his spouse and adult daughter, as well as her family, in a single family home in [place]. He is a member of the [employer] community, [employer]. 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 He use to go to [facility] in 2013/2014, but changed clinics to [facility], as finally [facility] near [address]. [facility], and then fell off his [employer] insurance due to a county error, but is back on for May 1st 2020.    Activities of daily living (ADL)/instrumental activities of daily living (IADL) and functional issues  Client needs help with the following ADL's: None  Client needs help with the following IADL's: housekeeping  Client states he is unable to perform the above due to Chronic knee pain, more in the right than the left. He states that it shoots down his ankle and up to his hip.    Health concerns for today  No major concerns today. He continues to have ongoing bilateral knee pain, and more in the right one. He had a MWV in November 2019. Does not get the flu vaccine as he felt ill after getting it a few years back.  Has patient fallen 2 or more times in the last year? No  Has patient fallen with injury in the last year? No    Cognition/mental health  Denies any mental health issues or memory impairment. He has had some stressful events occur last year, but reports that he feels stable and has no concerns. He relies on his nichole and prayer.    STARS/Med Adherence  Client is non-compliant with the following quality measures: Colon cancer screening  Comments: education efforts-Discussed FIT testing. Also discussed Advance Directives today and encouraged him to fill one out. Will consider this.    Client's Plan of Care consists of:  No  supports needed at this time    Monika Ely, [facility]  [phone]

## 2020-06-08 ENCOUNTER — VIRTUAL VISIT (OUTPATIENT)
Dept: FAMILY MEDICINE | Facility: CLINIC | Age: 75
End: 2020-06-08
Payer: COMMERCIAL

## 2020-06-08 DIAGNOSIS — E86.0 DEHYDRATION: Primary | ICD-10-CM

## 2020-06-08 NOTE — PROGRESS NOTES
Preceptor attestation:    I discussed the patient with the resident, and I spoke to the patient by telephone. I have verified the content of the note, which accurately reflects my assessment of the patient and the plan of care.    Supervising physician: Alice Quigley MD  [facility]

## 2020-06-08 NOTE — PROGRESS NOTES
"Family Medicine Telephone Visit Note           Telephone Visit Consent   Patient was verbally read the following and verbal consent was obtained.    \"Telephone visits are billed at different rates depending on your insurance coverage. During this emergency period, for some insurers they may be billed the same as an in-person visit.  Please reach out to your insurance provider with any questions.  If during the course of the call the physician/provider feels a telephone visit is not appropriate, you will not be charged for this service.\"    Name person giving consent:  Patient   Date verbal consent given:  6/8/2020  Time verbal consent given:  8:04 AM     Chief Complaint   Patient presents with     other     think got dehydrated because of the episode on Friday per patient.      Medication Reconciliation     reviewed.             HPI   Patients name: Mike  Appointment start time:  8:14 AM     Mike Santiago is a very pleasant 74 year old male with a telephone visit today for concerns of dizziness.  [place]  He notes that it required a lot of up and down from the ladder and it was very hot.  After he returned home he was unloading his truck when he started to feel dizzy and lightheaded.  He was feeling very shaky at the time.  Patient's daughter does have type 1 diabetes and checked his blood sugar and it was normal.  Blood pressure was in the 160s/70s.  He denies any recent illnesses.  No fevers, cough, shortness of breath, nausea, vomiting or diarrhea.  He did not notice any palpitations during the episode.    Current Outpatient Medications   Medication Sig Dispense Refill     FISH OIL        polyethylene glycol (MIRALAX/GLYCOLAX) packet Take 17 g by mouth daily 30 packet 3     pravastatin (PRAVACHOL) 10 MG tablet Take 1 tablet (10 mg) by mouth At Bedtime 90 tablet 3     rivaroxaban ANTICOAGULANT (XARELTO) 15 MG TABS tablet Take by mouth daily (with dinner)       " "rivaroxaban ANTICOAGULANT (XARELTO) 20 MG TABS tablet Take 20 mg by mouth daily       VITAMIN E 1 tablet daily       valACYclovir (VALTREX) 1000 mg tablet Take 2 tablets (2,000 mg) by mouth 2 times daily for 1 day 4 tablet 11     Allergies   Allergen Reactions     Norfloxacin Dermatitis     Rash     Shrimp           Review of Systems:     Constitutional, HEENT, cardiovascular, pulmonary, gi and gu systems are negative, except as otherwise noted.         Physical Exam:     There were no vitals taken for this visit.  Estimated body mass index is 32.32 kg/m  as calculated from the following:    Height as of 11/25/19: 1.82 m (5' 11.65\").    Weight as of 11/25/19: 107 kg (236 lb).    Exam:  Constitutional: healthy, alert and no distress  Psychiatric: mentation appears normal and affect normal/bright        Assessment and Plan   1. Dehydration  2. Vasovagal Near syncope  Patient had a near syncopal episode 3 days ago likely secondary to dehydration and vasovagal reaction.  After fluid rehydration the patient was feeling significantly better.  He denies any palpitations or any other symptoms.  This is a one-time event and has not reoccurred.  He was able to do a 2-hour walk yesterday at [facility] without difficulty.  Discussed with him the importance of good hydration and using Pedialyte on days that are extremely hot.  Discussed with patient that if he should start having another episode or recurrent episodes he should come in to clinic for reevaluation so we can do further work-up and evaluation for other possible causes.    Refilled medications that would be required in the next 3 months.     After Visit Information:  Patient declined AVS     No follow-ups on file.    Appointment end time: 8:29 am  This is a telephone visit that took 15 minutes.      Clinician location:  [facility]    Liat Lamas, DO  I precepted today with Dr. Quigley.    "

## 2020-09-22 ENCOUNTER — OFFICE VISIT (OUTPATIENT)
Dept: FAMILY MEDICINE | Facility: CLINIC | Age: 75
End: 2020-09-22
Payer: COMMERCIAL

## 2020-09-22 VITALS
TEMPERATURE: 97.4 F | SYSTOLIC BLOOD PRESSURE: 133 MMHG | RESPIRATION RATE: 18 BRPM | HEART RATE: 59 BPM | DIASTOLIC BLOOD PRESSURE: 83 MMHG | WEIGHT: 214 LBS | BODY MASS INDEX: 29.31 KG/M2 | OXYGEN SATURATION: 98 %

## 2020-09-22 DIAGNOSIS — D68.2 FACTOR II DEFICIENCY (H): Primary | ICD-10-CM

## 2020-09-22 NOTE — PROGRESS NOTES
S: Mike Santiago is a 74 year old male who returns for follow up of  Anticoagulation therapy for hx of PEs  Run out mediations few days ago   Asymptomatic, very active.   Patients states that main concern today is refill meds .    PMHX/PSHX/MEDS/ALLERGIES/SHX/FHX reviewed and updated in Epic.      ROS:  General: No fevers, chills  Head: No headache  Ears: No acute change in hearing.    CV: No chest pain or palpitations.  Resp: No shortness of breath.  No cough. No hemoptysis.  GI: No nausea, vomiting, constipation, diarrhea  : No urinary pains    O: /83 (BP Location: Left arm, Patient Position: Sitting, Cuff Size: Adult Large)   Pulse 59   Temp 97.4  F (36.3  C) (Tympanic)   Resp 18   Wt 97.1 kg (214 lb)   SpO2 98%   BMI 29.31 kg/m     Gen:  Well nourished and in NAD   Coagulopathy/PE  Neck: supple without lymphadenopathy  CV:  RRR  - no murmurs, rubs, or gallups,   Pulm:  CTAB, no wheezes/rales/rhonchi, good air entry   ABD: soft, nontender, no masses, no rebound, BS intact throughout  Extrem: no cyanosis, edema or clubbing  Psych: Euthymic      Coagulopathy/PE  Refill xaralto/called pharmacy,y to very dose    Total of 25 minutes was spent in face to face contact with patient with > 50% in counseling and coordination of care.  Options for treatment and/or follow-up care were reviewed with the patient. Mike Santiago was engaged and actively involved in the decision making process. He verbalized understanding of the options discussed and was satisfied with the final plan.    RTC for coordination of care with PMD  or sooner if develops new or worsening symptoms.    Emmanuel Dumont MD

## 2020-10-26 ENCOUNTER — OFFICE VISIT (OUTPATIENT)
Dept: AUDIOLOGY | Facility: CLINIC | Age: 75
End: 2020-10-26
Payer: COMMERCIAL

## 2020-10-26 DIAGNOSIS — H90.3 BILATERAL SENSORINEURAL HEARING LOSS: Primary | ICD-10-CM

## 2020-10-26 PROCEDURE — 99207 PR ASSESSMENT FOR HEARING AID: CPT | Performed by: AUDIOLOGIST

## 2020-10-26 NOTE — PROGRESS NOTES
AUDIOLOGY REPORT    SUBJECTIVE:Mike Santiago is a 74 year old male who was seen in the Audiology Clinic at the [facility] on 11/25/2019  for a follow-up check regarding hearing aids. The patient has been seen previously on 8/27/2019, and results revealed mild sensorineural hearing loss rising to normal hearing sloping to profound sensorineural hearing loss for the right ear and mild sloping to profound sensorineural hearing loss for the left ear. The patient has been seen previously in this clinic and was fit with  binaural Signia Pure Charge and Go 3NX on 11/4/2019.  Mike reports good sound quality with the hearig aids, but his left was not working.   He doesn't use them when he is working with loud noise. 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    He was reminded on how to change both and given wax filters.    No charge visit today (in warranty hearing aid check).     ASSESSMENT: A follow-up appointment for hearing aid fitting was completed today. IOI-HA administered today. Changes to hearing aid was completed as outlined above.     PLAN:Mike will return for follow-up as needed, or at least every 4-6 months for cleaning and assessment of hearing aid.  . Please call this clinic with any questions regarding today s appointment.    Zeus Marc, CCC-A  Licensed Audiologist  [place] #[id number]

## 2020-11-22 ENCOUNTER — HEALTH MAINTENANCE LETTER (OUTPATIENT)
Age: 75
End: 2020-11-22

## 2020-12-04 DIAGNOSIS — E78.5 HYPERLIPIDEMIA LDL GOAL <130: ICD-10-CM

## 2020-12-04 RX ORDER — PRAVASTATIN SODIUM 10 MG
TABLET ORAL
Qty: 90 TABLET | Refills: 0 | Status: SHIPPED | OUTPATIENT
Start: 2020-12-04 | End: 2021-03-22

## 2021-02-15 ENCOUNTER — TELEPHONE (OUTPATIENT)
Dept: FAMILY MEDICINE | Facility: CLINIC | Age: 76
End: 2021-02-15

## 2021-02-16 NOTE — TELEPHONE ENCOUNTER
I spoke today with Mike's spouse, Caterina, who reports that they are out of town. She also stated that she does not feel that her spouse would be interested in getting the vaccination, but that she would pass my message to Mike.    In the past, Mike has expressed to me that he is not interested in the flu vaccine, and Caterina did note that since he has questions about the flu vaccine, he most likely will not want to get the COVID vaccine.    MANNIE Jeronimo  [phone]  [facility]

## 2021-02-17 ENCOUNTER — TELEPHONE (OUTPATIENT)
Dept: FAMILY MEDICINE | Facility: CLINIC | Age: 76
End: 2021-02-17

## 2021-03-17 DIAGNOSIS — E78.5 HYPERLIPIDEMIA LDL GOAL <130: ICD-10-CM

## 2021-03-22 RX ORDER — PRAVASTATIN SODIUM 10 MG
TABLET ORAL
Qty: 90 TABLET | Refills: 0 | Status: SHIPPED | OUTPATIENT
Start: 2021-03-22 | End: 2021-06-28

## 2021-04-04 ENCOUNTER — HEALTH MAINTENANCE LETTER (OUTPATIENT)
Age: 76
End: 2021-04-04

## 2021-05-06 ENCOUNTER — DOCUMENTATION ONLY (OUTPATIENT)
Dept: FAMILY MEDICINE | Facility: CLINIC | Age: 76
End: 2021-05-06

## 2021-05-06 NOTE — PROGRESS NOTES
Telephone call for annual health risk assessment.  An  was not needed.    Current situation/living environment  Mike is a 75 year old male who lives with his spouse and adult daughter, as well as her family, in a single family home in [place]. He is a member of the [employer] community, [employer]. 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 He use to go to [facility] in 2013/2014, but changed clinics to [facility], as finally [facility] near [address].    Activities of daily living (ADL)/instrumental activities of daily living (IADL) and functional issues  Client needs help with the following ADL's: None  Client needs help with the following IADL's: housekeeping  Client states he is unable to perform the above due to Chronic knee pain, more in the right than the left. He states that it shoots down his ankle and up to his hip.    Health Concerns  No major concerns today. He is wanting to have his ears cleaned of wax, so I sent him a list of providers in his area. He had a MWV in November 2019. Does not get the flu vaccine as he felt ill after getting it a few years back.  Has patient fallen 2 or more times in the last year? No  Has patient fallen with injury in the last year? No    Cognition/mental health  Denies any mental health issues or memory impairment. He reports that he feels stable and has no concerns. He relies on his nichole and prayer.    STARS/Med Adherence  Client is non-compliant with the following quality measures: Colon cancer screening and MWV  Comments: education efforts-Discussed FIT testing. Also discussed Advance Directives today and encouraged him to fill one out. Will consider this.    Client's Plan of Care consists of:  No supports needed at this time    MANNIE Jeronimo  [phone]

## 2021-06-03 NOTE — PROGRESS NOTES
HPI:  I am consulted by Emmanuel Dumont MD in this 74 y.o. male regarding a recurrent inguinal and recurrent umbilical hernia. He has noted the inguinal recurrence for the past few months, and the umbilical hernia for years. Both were repaired decades ago; he believes mesh was placed. He has discomfort and a bulge. The pain is mild.    Allergies   Allergen Reactions     Norfloxacin Dermatitis     Other reaction(s): Atopic Dermatitis  Rash  Rash           Current Outpatient Medications:      polyethylene glycol (MIRALAX) 17 gram packet, Take 17 g by mouth., Disp: , Rfl:      rivaroxaban (XARELTO) 15 mg tablet, Take by mouth., Disp: , Rfl:      valACYclovir (VALTREX) 1000 MG tablet, Take 2,000 mg by mouth., Disp: , Rfl:      vitamin E 100 UNIT capsule, 1 tablet by NOT APPLICABLE route., Disp: , Rfl:      pravastatin (PRAVACHOL) 10 MG tablet, Take 10 mg by mouth., Disp: , Rfl:     History reviewed. No pertinent past medical history.    Past Surgical History:   Procedure Laterality Date     HERNIA REPAIR         Social History     Socioeconomic History     Marital status:      Spouse name: Not on file     Number of children: Not on file     Years of education: Not on file     Highest education level: Not on file   Occupational History     Not on file   Social Needs     Financial resource strain: Not on file     Food insecurity:     Worry: Not on file     Inability: Not on file     Transportation needs:     Medical: Not on file     Non-medical: Not on file   Tobacco Use     Smoking status: Never Smoker     Smokeless tobacco: Never Used   Substance and Sexual Activity     Alcohol use: Yes     Drug use: Not on file     Sexual activity: Not on file   Lifestyle     Physical activity:     Days per week: Not on file     Minutes per session: Not on file     Stress: Not on file   Relationships     Social connections:     Talks on phone: Not on file     Gets together: Not on file     Attends Protestant service: Not on  file     Active member of club or organization: Not on file     Attends meetings of clubs or organizations: Not on file     Relationship status: Not on file     Intimate partner violence:     Fear of current or ex partner: Not on file     Emotionally abused: Not on file     Physically abused: Not on file     Forced sexual activity: Not on file   Other Topics Concern     Not on file   Social History Narrative     Not on file       Review of Systems - Negative except he is on Xaralto for a PE 4 years ago.    /60 (Patient Site: Right Arm, Patient Position: Sitting, Cuff Size: Adult Regular)   Pulse 68   Wt (!) 232 lb (105.2 kg)   SpO2 98%   There is no height or weight on file to calculate BMI.    EXAM:  GENERAL: Well developed male in no distress.  CARDIAC: RRR w/out murmur   CHEST/LUNG: Clear  ABDOMEN:  Right inguinal hernia. The left side is normal. Umbilical hernia just superior to the umbilicus. Abdomen soft and nontender.  GENITAL: Both testicles descended without masses      Assessment/Plan: This patient has a recurrent right inguinal hernia and recurrent umbilical hernia. I discussed this at length with him.  I went over conservative management as well as surgical treatment of this. I will plan on repairing both hernias via an open approach. He will need a general anesthetic, and need to be off Xaralto for several days preoperatively.  I went over the small risks of surgery including but not limited to bleeding and infection. I discussed the expected recovery time as well. He will have an activity restriction for 4 weeks of no lifting over 20 pounds and no strenuous activity.   Will get this scheduled.      Darnell Gonzales MD  [facility] Department of Surgery

## 2021-06-04 VITALS — WEIGHT: 232 LBS | BODY MASS INDEX: 31.42 KG/M2 | HEIGHT: 72 IN

## 2021-06-04 VITALS
BODY MASS INDEX: 31.77 KG/M2 | WEIGHT: 232 LBS | OXYGEN SATURATION: 98 % | SYSTOLIC BLOOD PRESSURE: 124 MMHG | DIASTOLIC BLOOD PRESSURE: 60 MMHG | HEART RATE: 68 BPM

## 2021-06-04 NOTE — ANESTHESIA PREPROCEDURE EVALUATION
Anesthesia Evaluation      Patient summary reviewed   No history of anesthetic complications     Airway   Mallampati: II  Neck ROM: full   Pulmonary - normal exam   (-) not a smoker    ROS comment: H/o PE - on chronic xarelto                         Cardiovascular - normal exam  Exercise tolerance: > or = 4 METS  (+) , hypercholesterolemia,      Neuro/Psych - negative ROS     Endo/Other    (+) obesity (bmi 32),      GI/Hepatic/Renal    (+)   chronic renal disease (Cr 1.2) CRI,           Dental    (+) poor dentition    Comment: Upper partial                       Anesthesia Plan  Planned anesthetic: general LMA and total IV anesthesia  Propofol infusion  Fentanyl/ketamine PRN  Decadron/zofran  Avoid midazolam due to age  Avoid toradol due to kidney function    ASA 3   Induction: intravenous   Anesthetic plan and risks discussed with: patient and spouse  Anesthesia plan special considerations: antiemetics,   Post-op plan: routine recovery    Labs reviewed.  Imaging reviewed.

## 2021-06-04 NOTE — PROGRESS NOTES
HPI: Pt is here for follow up of a umbilical and right inguinal open hernia repair by Dr. Gonzales on December 17, 2019.   he is doing well.  Pain is well controlled.  No difficulties with the surgical wound/wounds.  he is eating well and denies fever and chills.  Only complaint today is that he has a little bit of burning sensation at the area that there is an ax and not at his incision site.      There were no vitals taken for this visit.    EXAM:  GENERAL:Appears well  ABDOMEN:  Soft, +BS  SURGICAL WOUNDS:  Incisions healing well, no enduration or drainage.      Assessment/Plan: . Doing well after surgery and should follow up as needed.  Reassurance given at this time.  Mayra Becerra PA-C  [facility] Department of Surgery

## 2021-06-04 NOTE — ANESTHESIA POSTPROCEDURE EVALUATION
Patient: Mike Santiago  HERNIORRHAPHY, RIGHT INGUINAL, OPEN, UMBILICAL HERNIA REPAIR  Anesthesia type: general    Patient location: Phase II Recovery  Last vitals:   Vitals Value Taken Time   /60 12/17/2019  5:45 PM   Temp 36.6  C (97.8  F) 12/17/2019  5:02 PM   Pulse 60 12/17/2019  5:45 PM   Resp 16 12/17/2019  5:45 PM   SpO2 98 % 12/17/2019  5:45 PM     Post vital signs: stable  Level of consciousness: awake and responds to simple questions  Post-anesthesia pain: pain controlled  Post-anesthesia nausea and vomiting: no  Pulmonary: unassisted, return to baseline  Cardiovascular: stable and blood pressure at baseline  Hydration: adequate  Anesthetic events: no    QCDR Measures:  ASA# 11 - Berenice-op Cardiac Arrest: ASA11B - Patient did NOT experience unanticipated cardiac arrest  ASA# 12 - Berenice-op Mortality Rate: ASA12B - Patient did NOT die  ASA# 13 - PACU Re-Intubation Rate: ASA13B - Patient did NOT require a new airway mgmt  ASA# 10 - Composite Anes Safety: ASA10A - No serious adverse event    Additional Notes:

## 2021-06-04 NOTE — ANESTHESIA CARE TRANSFER NOTE
Last vitals:   Vitals:    12/17/19 1636   BP: 130/65   Pulse: 78   Resp: 20   Temp: 37.7  C (99.8  F)   SpO2: 97%     Patient's level of consciousness is drowsy  Spontaneous respirations: yes  Maintains airway independently: yes  Dentition unchanged: yes  Oropharynx: oropharynx clear of all foreign objects    QCDR Measures:  ASA# 20 - Surgical Safety Checklist: WHO surgical safety checklist completed prior to induction    PQRS# 430 - Adult PONV Prevention: 4558F - Pt received => 2 anti-emetic agents (different classes) preop & intraop  ASA# 8 - Peds PONV Prevention: NA - Not pediatric patient, not GA or 2 or more risk factors NOT present  PQRS# 424 - Berenice-op Temp Management: 4559F - At least one body temp DOCUMENTED => 35.5C or 95.9F within required timeframe  PQRS# 426 - PACU Transfer Protocol: - Transfer of care checklist used  ASA# 14 - Acute Post-op Pain: ASA14B - Patient did NOT experience pain >= 7 out of 10

## 2021-06-12 DIAGNOSIS — D68.2 FACTOR II DEFICIENCY (H): ICD-10-CM

## 2021-06-15 RX ORDER — RIVAROXABAN 20 MG/1
TABLET, FILM COATED ORAL
Qty: 90 TABLET | Refills: 0 | Status: SHIPPED | OUTPATIENT
Start: 2021-06-15 | End: 2021-09-13

## 2021-06-27 DIAGNOSIS — E78.5 HYPERLIPIDEMIA LDL GOAL <130: ICD-10-CM

## 2021-06-28 RX ORDER — PRAVASTATIN SODIUM 10 MG
TABLET ORAL
Qty: 90 TABLET | Refills: 0 | Status: SHIPPED | OUTPATIENT
Start: 2021-06-28 | End: 2021-09-23

## 2021-09-10 DIAGNOSIS — D68.2 FACTOR II DEFICIENCY (H): ICD-10-CM

## 2021-09-13 RX ORDER — RIVAROXABAN 20 MG/1
TABLET, FILM COATED ORAL
Qty: 90 TABLET | Refills: 0 | Status: SHIPPED | OUTPATIENT
Start: 2021-09-13 | End: 2021-12-17

## 2021-09-19 ENCOUNTER — HEALTH MAINTENANCE LETTER (OUTPATIENT)
Age: 76
End: 2021-09-19

## 2021-09-23 DIAGNOSIS — E78.5 HYPERLIPIDEMIA LDL GOAL <130: ICD-10-CM

## 2021-09-23 RX ORDER — PRAVASTATIN SODIUM 10 MG
TABLET ORAL
Qty: 90 TABLET | Refills: 0 | Status: SHIPPED | OUTPATIENT
Start: 2021-09-23 | End: 2022-01-10

## 2021-12-16 DIAGNOSIS — D68.2 FACTOR II DEFICIENCY (H): ICD-10-CM

## 2021-12-17 RX ORDER — RIVAROXABAN 20 MG/1
TABLET, FILM COATED ORAL
Qty: 90 TABLET | Refills: 0 | Status: SHIPPED | OUTPATIENT
Start: 2021-12-17 | End: 2022-02-01

## 2022-01-08 DIAGNOSIS — E78.5 HYPERLIPIDEMIA LDL GOAL <130: ICD-10-CM

## 2022-01-10 RX ORDER — PRAVASTATIN SODIUM 10 MG
TABLET ORAL
Qty: 90 TABLET | Refills: 0 | Status: SHIPPED | OUTPATIENT
Start: 2022-01-10 | End: 2022-02-01

## 2022-01-26 ENCOUNTER — TELEPHONE (OUTPATIENT)
Dept: AUDIOLOGY | Facility: CLINIC | Age: 77
End: 2022-01-26
Payer: COMMERCIAL

## 2022-01-26 NOTE — TELEPHONE ENCOUNTER
[facility] Call Center    Phone Message    May a detailed message be left on voicemail: yes     Reason for Call: Other: Pt called in with questions about the ear piece of the hearing aids. Please call to discuss. Thank you     Action Taken: Message routed to:  [facility] ([facility] Audiology    Travel Screening: Not Applicable

## 2022-01-26 NOTE — TELEPHONE ENCOUNTER
Patient requesting more domes. Will be mailed out to patient.      Obi Bennett  Audiologist  [place] License  #[id number]

## 2022-02-01 ENCOUNTER — LAB (OUTPATIENT)
Dept: LAB | Facility: CLINIC | Age: 77
End: 2022-02-01
Payer: COMMERCIAL

## 2022-02-01 ENCOUNTER — OFFICE VISIT (OUTPATIENT)
Dept: FAMILY MEDICINE | Facility: CLINIC | Age: 77
End: 2022-02-01
Payer: COMMERCIAL

## 2022-02-01 VITALS
OXYGEN SATURATION: 98 % | SYSTOLIC BLOOD PRESSURE: 120 MMHG | BODY MASS INDEX: 30.1 KG/M2 | WEIGHT: 215 LBS | HEIGHT: 71 IN | DIASTOLIC BLOOD PRESSURE: 76 MMHG | TEMPERATURE: 97.7 F | RESPIRATION RATE: 18 BRPM | HEART RATE: 70 BPM

## 2022-02-01 DIAGNOSIS — E78.5 HYPERLIPIDEMIA LDL GOAL <130: ICD-10-CM

## 2022-02-01 DIAGNOSIS — D68.2 FACTOR II DEFICIENCY (H): Primary | ICD-10-CM

## 2022-02-01 DIAGNOSIS — Z29.9 PREVENTIVE MEASURE: ICD-10-CM

## 2022-02-01 DIAGNOSIS — R35.0 BENIGN PROSTATIC HYPERPLASIA WITH URINARY FREQUENCY: ICD-10-CM

## 2022-02-01 DIAGNOSIS — N40.1 BENIGN PROSTATIC HYPERPLASIA WITH URINARY FREQUENCY: ICD-10-CM

## 2022-02-01 PROCEDURE — G0439 PPPS, SUBSEQ VISIT: HCPCS | Performed by: FAMILY MEDICINE

## 2022-02-01 PROCEDURE — 82274 ASSAY TEST FOR BLOOD FECAL: CPT

## 2022-02-01 RX ORDER — ERGOCALCIFEROL 1.25 MG/1
50000 CAPSULE ORAL
COMMUNITY

## 2022-02-01 RX ORDER — TAMSULOSIN HYDROCHLORIDE 0.4 MG/1
0.4 CAPSULE ORAL DAILY
Qty: 30 CAPSULE | Refills: 3 | Status: SHIPPED | OUTPATIENT
Start: 2022-02-01

## 2022-02-01 RX ORDER — PRAVASTATIN SODIUM 10 MG
TABLET ORAL
Qty: 90 TABLET | Refills: 4 | Status: SHIPPED | OUTPATIENT
Start: 2022-02-01 | End: 2023-03-31

## 2022-02-01 ASSESSMENT — MIFFLIN-ST. JEOR: SCORE: 1717.36

## 2022-02-01 NOTE — PROGRESS NOTES
65+ Annual Wellness Visit         HPI     This 76 year old male presents as an established patient  Emmanuel Dumont who presents for a Subsequent Medicare Wellness Visit    Able to see well driving since cataract replacement.    Experiencing urinary frequency with occasional need to get up at night. Does not experience frequency when keeping busy, but if not preoccupied can have frequency up to once and hour. Occasionally with have weak stream as well.    Keeping very active and eating a well balanced diet.     Patient has no other concerns.    Other issues patient wants to be addressed today:    Chief Complaint   Patient presents with     Wellness Visit     yearly wellness visit       Patient Active Problem List   Diagnosis     Hyperlipidemia LDL goal <130     Health Care Home     Cataract of both eyes     Presbycusis of both ears     Tinnitus     Obesity     Factor II deficiency (H)     Acute pulmonary embolism (H)       Past Medical History:   Diagnosis Date     Conductive hearing loss      Hearing problem      Tinnitus         Family History   Problem Relation Age of Onset     Asthma Father      Heart Disease Father      Cerebrovascular Disease Mother      Diabetes Daughter      Cancer No family hx of          Problem List, Family History and past Medical History reviewed and unchanged/updated.      Visual Acuity:  Right Eye: 10/16   Left Eye: 10/12.5  Both Eyes: 10/10        FALL RISK ASSESSMENT 2/1/2022 9/22/2020 11/25/2019 11/18/2019   Fallen 2 or more times in the past year? No No No No   Any fall with injury in the past year? No No No No            Health Risk Assessment / Review of Systems     Constitutional: Any fevers or night sweats? No     Eyes:  Vision problems   No     Hearing Do you feel you have hearing loss?   YES     Cardiovascular: Any chest pain, fast or irregular heart beat, calf pain with walking?     No           Respiratory:   Any breathing problems or cough?   No     Gastrointestinal: Any  stomach or stool problems?   No      Genitourinary: Do you have difficulty controlling urination?   Yes    Muscles and Joints: Any joint stiffness or soreness?    YES, without concerning features    Skin: Any concerning lesions or moles?   No     Nervous System: Any loss of strength or feeling, numbness or tingling, shaking, dizziness, or headache?  No     Mental Health: Any depression, anxiety or problems sleeping?    No     Cognition: Do you have any problems with your memory?  No     PHQ-2 Score:   PHQ-2 (  [employer]) 2022   Q1: Little interest or pleasure in doing things 0 0   Q2: Feeling down, depressed or hopeless 0 0   PHQ-2 Score 0 0   PHQ-2 Total Score (12-17 Years)- Positive if 3 or more points; Administer PHQ-A if positive - 0   Q1: Little interest or pleasure in doing things - -   Q2: Feeling down, depressed or hopeless - -   PHQ-2 Score - -       PHQ-9 Score:   No flowsheet data found.         Medical Care     What other specialists or organizations are involved in your medical care?    Patient Care Team       Relationship Specialty Notifications Start End    Emmanuel Dumont MD PCP - General Family Practice  1/14/15     Phone: [phone] Fax: [phone] [address] [facility] [address]    Monika Ely    12     [facility] [place]  [phone]    Emmanuel Dumont MD Assigned PCP   21     Phone: [phone] Fax: [phone] [address] [facility] [address]    Becky Campos AuD Assigned Surgical Provider   21     Phone: [phone] Fax: [phone]         [facility] [address] [address]                 Social History / Home Safety     Social History     Tobacco Use     Smoking status: Former Smoker     Packs/day: 0.00     Years: 5.00     Pack years: 0.00     Types: Cigarettes     Start date: 1962     Quit date: 1966     Years since quittin.1     Smokeless tobacco: Never Used   Substance Use Topics     Alcohol  "use: Yes     Comment: Some     Marital Status:  Who lives in your household? Son in-law and family    Does your home have any of the following safety concerns? Loose rugs in the hallway, no grab bars in the bathroom, no handrails on the stairs or have poorly lit areas?   YES     Do you feel threatened or controlled by a partner, ex-partner or anyone in your life? No     Has anyone hurt you physically, for example by pushing, hitting, slapping or kicking you   or forcing you to have sex? No          Functional Status     Do you need help with dressing yourself, bathing, or walking?No     Do you need help with the phone, transportation, shopping, preparing meals, housework, laundry, medications or managing money?No       Risk Behaviors and Healthy Habits     History   Smoking Status     Former Smoker     Packs/day: 0.00     Years: 5.00     Types: Cigarettes     Start date: 1/1/1962     Quit date: 1/1/1966   Smokeless Tobacco     Never Used     How many servings of fruits and vegetables do you eat a day? 2    Exercise: None ,work     Do you frequently drive without a seatbelt? No     Do you use any other drugs? No         Do you use alcohol?No      Frailty Assessment            1. By yourself and note using aids, do you have difficulty walking up 10 steps without resting?  No  (1 for Yes, 0 for No)    2. By yourself and not using mobility aids, do you have any difficulty walking several hundred yards? No  (1 for Yes, 0 for No)    3. Have you lost 10 or more pounds unintentionally in the previous year? No  (If \"Yes\" and >5% weight loss, then score 1.  Score 0, if <5% weight loss or \"No\" weight loss)    4. How much of the times during the past 3 weeks did you feel tired? 4. A little of the time (\"1\" or \"2\" are scored 1, others 0)    5.  A doctor told the patient they had the following illnesses:   (0-4 = score 0, 5-11= score 1)        Frailty Assessment              1. (1 for Yes, 0 for No)    2. (1 for Yes, 0 for " "No)    3. (If \"Yes\" and >5% weight loss, then score 1.  Score 0, if <5% weight loss or \"No\" weight loss)    4. (\"1\" or \"2\" are scored 1, others 0)    5. Count number of illnesses. (0-4 = score 0, 5-11= score 1)    Total score: 0    Frailty screen score (3-5 = frail; consider interdisciplinary assessment and care.  1-2 = prefrail; at risk for adverse health events; 0 = robust)      EVALUATION OF COGNITIVE FUNCTION     Mood/affect:Normal  Appearance:Normal  Family member/caregiver input: Normal    Mini Cog Scoring   3 points   Clock Draw Test result:  Normal     Cognitive screen is:Negative      Other Assessments     CV Risk based on Pooled Cohort Risk (consider assessing every 4-6 years; consider statin in patients with 10-yr risk > 7.5%):     The 10-year ASCVD risk score (Mary MARCIAL Jr., et al., 2013) is: 27.3%    Values used to calculate the score:      Age: 76 years      Sex: Male      Is Non- : No      Diabetic: No      Tobacco smoker: No      Systolic Blood Pressure: 120 mmHg      Is BP treated: No      HDL Cholesterol: 51.1 mg/dL      Total Cholesterol: 226.4 mg/dL    Advance Directives: Discussed with patient and family as appropriate.  Has patient completed advance directives and/or a living will?  no       Immunization History   Administered Date(s) Administered     Influenza (IIV3) PF 10/27/2005, 10/10/2006, 10/25/2007, 12/01/2008, 09/22/2011, 09/13/2012     Pneumococcal 23 valent 03/02/2001, 08/20/2012     TD (ADULT, 7+) 10/20/1999, 11/29/2004     TDAP Vaccine (Adacel) 08/20/2012     Tdap (Adacel,Boostrix) 08/20/2012, 09/13/2012     Reviewed Immunization Record Today         Physical Exam     Vitals: /76 (BP Location: Right arm, Patient Position: Sitting, Cuff Size: Adult Large)   Pulse 70   Temp 97.7  F (36.5  C) (Oral)   Resp 18   Ht 1.803 m (5' 11\")   Wt 97.5 kg (215 lb)   SpO2 98%   BMI 29.99 kg/m    BMI= Body mass index is 29.99 kg/m .  EXAM:  Constitutional: healthy, " alert and no distress   Cardiovascular: negative, PMI normal. No lifts, heaves, or thrills. RRR. No murmurs, clicks gallops or rub  Respiratory: negative, Percussion normal. Good diaphragmatic excursion. Lungs clear  Psychiatric: mentation appears normal and affect normal/bright  Head: Normocephalic. No masses, lesions, tenderness or abnormalities  Neck: Neck supple. No adenopathy. Thyroid symmetric, normal size,  ENT: ENT exam normal, no neck nodes or sinus tenderness  SKIN: keratoses - one on temple one on scalp, no other suspicious lesion observed        Assessment and Plan       Reviewed Preventive Services and Plan form with patient as specified in Patient Instructions.    Mike was seen today for wellness visit.    Diagnoses and all orders for this visit:    Factor II deficiency (H)  -     Discontinue: rivaroxaban ANTICOAGULANT (XARELTO) 15 MG TABS tablet; Take 1 tablet (15 mg) by mouth daily (with dinner) Take by mouth daily (with dinner)  -     rivaroxaban ANTICOAGULANT (XARELTO ANTICOAGULANT) 20 MG TABS tablet; Take 1 tablet (20 mg) by mouth daily    Hyperlipidemia LDL goal <130  -     pravastatin (PRAVACHOL) 10 MG tablet; TAKE ONE TABLET BY MOUTH AT BEDTIME    Preventive measure  -     Fecal colorectal cancer screen FIT; Future    Benign prostatic hyperplasia with urinary frequency  -     tamsulosin (FLOMAX) 0.4 MG capsule; Take 1 capsule (0.4 mg) by mouth daily        Options for treatment and follow-up care were reviewed with the Mike Santiago and/or guardian engaged in the decision making process and verbalized understanding of the options discussed and agreed with the final plan.    Eb Valencia MS3  [facility]    Emmanuel Dumont MD    Preceptor Attestation:   I was present with the medical student who participated in the service and in the documentation of this note. I have verified the history and personally performed the physical exam and medical decision making. I have verified  the content of the note, which accurately reflects my assessment of the patient and the plan of care.   Supervising Physician:  Emmanuel Dumont MD.

## 2022-02-03 LAB — HEMOCCULT STL QL IA: NEGATIVE

## 2022-04-21 ENCOUNTER — OFFICE VISIT (OUTPATIENT)
Dept: AUDIOLOGY | Facility: CLINIC | Age: 77
End: 2022-04-21
Payer: COMMERCIAL

## 2022-04-21 DIAGNOSIS — H90.3 SENSORINEURAL HEARING LOSS, BILATERAL: Primary | ICD-10-CM

## 2022-04-21 NOTE — PROGRESS NOTES
AUDIOLOGY REPORT    SUBJECTIVE:  Mike Santiago is a 74 year old male who was seen in the Audiology Clinic at [facility] on 4/21/22 for a hearing aid check. The patient has been seen previously on 8/27/2019, and results revealed mild sensorineural hearing loss rising to normal hearing sloping to profound sensorineural hearing loss for the right ear and mild sloping to profound sensorineural hearing loss for the left ear. The patient has been seen previously in this clinic and was fit with  binaural Signia Pure Charge and Go 3NX on 11/4/2019.  Mike reports he received new domes in the mail, however he cannot get them to stay on. In addition, he would like more wax filters.    OBJECTIVE:   The hearing aids were cleaned and checked. Listening check was good. New domes were put on and we reviewed that they need to click on the end.     ASSESSMENT: A hearing aid clean and check was completed today.     PLAN:  Mike will return for follow-up as needed, or at least every 4-6 months for cleaning and assessment of hearing aid.  . Please call this clinic with any questions regarding today s appointment.    Angi Ward, [facility]  Licensed Audiologist  [place] License #[id number]

## 2022-05-03 ENCOUNTER — TRANSFERRED RECORDS (OUTPATIENT)
Dept: HEALTH INFORMATION MANAGEMENT | Facility: CLINIC | Age: 77
End: 2022-05-03
Payer: COMMERCIAL

## 2022-06-17 ENCOUNTER — TELEPHONE (OUTPATIENT)
Dept: OTOLARYNGOLOGY | Facility: CLINIC | Age: 77
End: 2022-06-17
Payer: COMMERCIAL

## 2022-11-21 ENCOUNTER — HEALTH MAINTENANCE LETTER (OUTPATIENT)
Age: 77
End: 2022-11-21

## 2023-03-30 DIAGNOSIS — E78.5 HYPERLIPIDEMIA LDL GOAL <130: ICD-10-CM

## 2023-03-30 DIAGNOSIS — D68.2 FACTOR II DEFICIENCY (H): ICD-10-CM

## 2023-03-31 RX ORDER — PRAVASTATIN SODIUM 10 MG
TABLET ORAL
Qty: 90 TABLET | Refills: 0 | Status: SHIPPED | OUTPATIENT
Start: 2023-03-31 | End: 2023-10-31

## 2023-03-31 RX ORDER — RIVAROXABAN 20 MG/1
TABLET, FILM COATED ORAL
Qty: 90 TABLET | Refills: 0 | Status: SHIPPED | OUTPATIENT
Start: 2023-03-31 | End: 2023-07-06

## 2023-04-16 ENCOUNTER — HEALTH MAINTENANCE LETTER (OUTPATIENT)
Age: 78
End: 2023-04-16

## 2023-05-01 ENCOUNTER — TRANSFERRED RECORDS (OUTPATIENT)
Dept: HEALTH INFORMATION MANAGEMENT | Facility: CLINIC | Age: 78
End: 2023-05-01
Payer: COMMERCIAL

## 2023-07-05 DIAGNOSIS — D68.2 FACTOR II DEFICIENCY (H): ICD-10-CM

## 2023-07-05 NOTE — TELEPHONE ENCOUNTER
[facility] phone call message- patient requesting a refill:    Full Medication Name:XARELTO ANTICOAGULANT 20 MG TABS tablet     Dose:  TAKE ONE TABLET BY MOUTH ONE TIME DAILY    Pharmacy confirmed as   [id number] - [place], [place] - [address] [place] [address]  Phone: [phone] Fax: [phone]  : Yes    Additional Comments: NONE     OK to leave a message on voice mail? Yes    Primary language: English      needed? No    Call taken on July 5, 2023 at 9:06 AM by Lupe Diaz

## 2023-07-06 NOTE — TELEPHONE ENCOUNTER
Please call:  I just refilled his xarelto.  It looks like it's been > 1 year since he's been seen here, and so I think  He should come in for a med check within the next 3 months.  Thanks  cF

## 2023-07-11 ENCOUNTER — TELEPHONE (OUTPATIENT)
Dept: AUDIOLOGY | Facility: CLINIC | Age: 78
End: 2023-07-11
Payer: COMMERCIAL

## 2023-07-11 NOTE — TELEPHONE ENCOUNTER
Walk-in hearing aid services on 7/11/23: The patient reported his hearing aids didn't seem to be working.  Examination noted significant wax accumulation on and in the domes.  The hearing aids were cleaned and the right  filter and dome were replaced.  The right device was found to be working normally.  The left  was determined to be dead on the left hearing aid.  It was replaced and the patient is being billed for the new part.  The hearing aids were both subsequently found to be working properly and were returned to the patient.

## 2023-07-14 ENCOUNTER — OFFICE VISIT (OUTPATIENT)
Dept: AUDIOLOGY | Facility: CLINIC | Age: 78
End: 2023-07-14
Payer: COMMERCIAL

## 2023-07-14 DIAGNOSIS — H90.3 SENSORINEURAL HEARING LOSS, BILATERAL: Primary | ICD-10-CM

## 2023-09-25 DIAGNOSIS — D68.2 FACTOR II DEFICIENCY (H): ICD-10-CM

## 2023-09-25 NOTE — TELEPHONE ENCOUNTER
[facility] phone call message- patient requesting a refill:    Full Medication Name: XARELTO     Dose: 20 mg    Pharmacy confirmed as         [facility][id number] - [place], [place] - [address] [facility]  [address] [place] [address]  Phone: [phone] Fax: [phone]  : Yes    Additional Comments: the pt has an appointment on 10/31/2023 but will be out before that      OK to leave a message on voice mail? Yes    Primary language: English      needed? No    Call taken on September 25, 2023 at 8:42 AM by Mike Bush

## 2023-10-31 ENCOUNTER — OFFICE VISIT (OUTPATIENT)
Dept: FAMILY MEDICINE | Facility: CLINIC | Age: 78
End: 2023-10-31
Payer: COMMERCIAL

## 2023-10-31 VITALS
DIASTOLIC BLOOD PRESSURE: 85 MMHG | TEMPERATURE: 97.5 F | RESPIRATION RATE: 16 BRPM | HEIGHT: 71 IN | HEART RATE: 70 BPM | BODY MASS INDEX: 32.34 KG/M2 | SYSTOLIC BLOOD PRESSURE: 136 MMHG | OXYGEN SATURATION: 99 % | WEIGHT: 231 LBS

## 2023-10-31 DIAGNOSIS — D68.2 FACTOR II DEFICIENCY (H): ICD-10-CM

## 2023-10-31 DIAGNOSIS — E78.5 HYPERLIPIDEMIA LDL GOAL <130: ICD-10-CM

## 2023-10-31 DIAGNOSIS — Z11.59 ENCOUNTER FOR HEPATITIS C SCREENING TEST FOR LOW RISK PATIENT: Primary | ICD-10-CM

## 2023-10-31 LAB
ALBUMIN SERPL BCG-MCNC: 3.9 G/DL (ref 3.5–5.2)
ALP SERPL-CCNC: 68 U/L (ref 40–129)
ALT SERPL W P-5'-P-CCNC: 13 U/L (ref 0–70)
ANION GAP SERPL CALCULATED.3IONS-SCNC: 10 MMOL/L (ref 7–15)
AST SERPL W P-5'-P-CCNC: 28 U/L (ref 0–45)
BILIRUB SERPL-MCNC: 0.3 MG/DL
BUN SERPL-MCNC: 21.7 MG/DL (ref 8–23)
CALCIUM SERPL-MCNC: 9 MG/DL (ref 8.8–10.2)
CHLORIDE SERPL-SCNC: 106 MMOL/L (ref 98–107)
CHOLEST SERPL-MCNC: 226 MG/DL
CREAT SERPL-MCNC: 1.41 MG/DL (ref 0.67–1.17)
DEPRECATED HCO3 PLAS-SCNC: 25 MMOL/L (ref 22–29)
EGFRCR SERPLBLD CKD-EPI 2021: 50 ML/MIN/1.73M2
GLUCOSE SERPL-MCNC: 92 MG/DL (ref 70–99)
HCV AB SERPL QL IA: NONREACTIVE
HDLC SERPL-MCNC: 69 MG/DL
LDLC SERPL CALC-MCNC: 144 MG/DL
NONHDLC SERPL-MCNC: 157 MG/DL
POTASSIUM SERPL-SCNC: 4.9 MMOL/L (ref 3.4–5.3)
PROT SERPL-MCNC: 6.9 G/DL (ref 6.4–8.3)
SODIUM SERPL-SCNC: 141 MMOL/L (ref 135–145)
TRIGL SERPL-MCNC: 67 MG/DL

## 2023-10-31 PROCEDURE — 99214 OFFICE O/P EST MOD 30 MIN: CPT | Mod: 25 | Performed by: FAMILY MEDICINE

## 2023-10-31 PROCEDURE — 86803 HEPATITIS C AB TEST: CPT | Performed by: FAMILY MEDICINE

## 2023-10-31 PROCEDURE — G0439 PPPS, SUBSEQ VISIT: HCPCS | Performed by: FAMILY MEDICINE

## 2023-10-31 PROCEDURE — 36415 COLL VENOUS BLD VENIPUNCTURE: CPT | Performed by: FAMILY MEDICINE

## 2023-10-31 PROCEDURE — 80061 LIPID PANEL: CPT | Performed by: FAMILY MEDICINE

## 2023-10-31 PROCEDURE — 80053 COMPREHEN METABOLIC PANEL: CPT | Performed by: FAMILY MEDICINE

## 2023-10-31 RX ORDER — PRAVASTATIN SODIUM 10 MG
TABLET ORAL
Qty: 90 TABLET | Refills: 3 | Status: SHIPPED | OUTPATIENT
Start: 2023-10-31 | End: 2023-11-08 | Stop reason: ALTCHOICE

## 2023-10-31 ASSESSMENT — ENCOUNTER SYMPTOMS
DIARRHEA: 0
FEVER: 0
EYE PAIN: 0
SORE THROAT: 0
NAUSEA: 0
HEARTBURN: 0
CHILLS: 0
HEMATURIA: 0
PARESTHESIAS: 0
ABDOMINAL PAIN: 0
PALPITATIONS: 0
MYALGIAS: 0
HEMATOCHEZIA: 0
HEADACHES: 0
COUGH: 0
SHORTNESS OF BREATH: 0
DIZZINESS: 0
JOINT SWELLING: 0
WEAKNESS: 0
ARTHRALGIAS: 1
DYSURIA: 0
FREQUENCY: 1
NERVOUS/ANXIOUS: 0
CONSTIPATION: 1

## 2023-10-31 ASSESSMENT — ACTIVITIES OF DAILY LIVING (ADL): CURRENT_FUNCTION: NO ASSISTANCE NEEDED

## 2023-10-31 NOTE — PATIENT INSTRUCTIONS
RESOURCES FOR OLDER ADULTS AND THEIR FAMILIES  [here are some resources that may apply to you now or in the future]      Senior LinkAge Line (372-722-5288) -- Free telephone consultation to learn about services for older adults and family caregivers including housing options, transportation (including [employer]), homemaker, outdoor chore, meals, grocery delivery, friendly visiting, telephone reassurance, home modification, respite and caregiver consultation.  https://[place].gov[place]senior-linkage-line/    For veterans -- LinkActimize Support (1-753.358.5866).  Answers on all [facility]-related questions including healthcare benefits for Veterans and their spouses. https://Living Proof.org/    For persons with disabilities -- Disability Hub [place].  Free [facility] resource network that helps people with lifelong or acquired disabilities solve problems, navigate the system and plan for the future.  https://disabilityhubmn.org/     Advance Care Planning / Living Rose  If you have not done so, you are encouraged to complete advance directives and/or a living will.   More information about advance directives can be found through:    The [place] [employer] Association.  https://www.mnmed.org/advocacy/Key-Issues/Advance-Directives     Honoring [facility].  https://www.honoringchoices.org/ OR call Open legal decision maker information at [phone]    The Senior LinkAge Line (873-297-6421) can provide assistance with completion of advance care planning documents.  https://[place].gov[place]senior-linkage-line/    Managing your Medication   Review your medications attached at the end of this document.  Note any changes made by your care team in today's encounter.    Let your provider know if you start taking any new prescription, over-the-counter (OTC), supplements, or herbal medications    Unwanted Medications- Find a location to safely take your unwanted medications  https://www.pca.state.[place]/living-green/managing-unwanted-medications    Support for Persons and Caregivers Concerned About Memory or Dementia  Alzheimer's Association -- 24/7 Hotline (1-545.168.6826).  https://www.alz.org/    Alzheimer's Disease Education and Referral Center (1-506.684.9762). https://www.rocco.nih.gov/health/eyhpu-ayhys-qxxdwa    Family Caregiver [facility] (1-650.439.8967). https://www.caregiver.org    Safety / Emergencies  Falls -- when someone falls, they should call 911 if they have suffered a serious injury. 911 will also provide a lift assist to get the person off the floor.     Fall prevention classes - can be taken through [employer] .  Classes are no or low-cost and some insurance plans cover the fee.  [facility] also offers Live Well and Get Fit classes.  To enroll in a class call (toll free) [phone] or register online at [email or web address]    Personal Emergency Response Systems (PERS) - Senior LinkAge Line (517-408-3410) can provide options specific to your location.  https://[place].gov[place]senior-linkage-line/    National Suicide Prevention Hotline: 1-657.930.5201 or text [place] to 873297.

## 2023-10-31 NOTE — PROGRESS NOTES
"Pt declined all vaccines    Pt run distances in high school and also wrestled.  He continued to do distance running for years.  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  He is  and likes ot go for walks with his wife in [place].    He has had family members with CAD and he is worried about the risk of that.    He has 8 kids and \"20 some\" grandkids; most in the area.  He sees them regularly.    Answers submitted by the patient for this visit:  Annual Preventive Visit (Submitted on 10/31/2023)  Chief Complaint: Annual Exam:  In general, how would you rate your overall physical health?: good  Frequency of exercise:: None  Do you usually eat at least 4 servings of fruit and vegetables a day, include whole grains & fiber, and avoid regularly eating high fat or \"junk\" foods? : No  Taking medications regularly:: Yes  Medication side effects:: None  Activities of Daily Living: no assistance needed  Home safety: no safety concerns identified  Hearing Impairment:: difficulty following a conversation in a noisy restaurant or crowded room, difficult to understand a speaker at a public meeting or Taoism service, need to ask people to speak up or repeat themselves, difficulty understanding soft or whispered speech  In the past 6 months, have you been bothered by leaking of urine?: Yes  abdominal pain: No  Blood in stool: No  Blood in urine: No  chest pain: Yes  chills: No  congestion: No  constipation: Yes  cough: No  diarrhea: No  dizziness: No  ear pain: No  eye pain: No  nervous/anxious: No  fever: No  frequency: Yes  genital sores: No  headaches: No  hearing loss: Yes  heartburn: No  arthralgias: Yes  joint swelling: No  peripheral edema: Yes  mood changes: No  myalgias: No  nausea: No  dysuria: No  palpitations: No  Skin sensation changes: No  sore throat: No  urgency: No  rash: No  shortness of breath: No  visual disturbance: No  weakness: No  impotence: Yes  penile discharge: No  In " "general, how would you rate your overall mental or emotional health?: good  Additional concerns today:: No  SUBJECTIVE:   Mike is a 78 year old who presents for Preventive Visit.      10/31/2023     8:17 AM   Additional Questions   Roomed by Mao   Accompanied by self       Are you in the first 12 months of your Medicare coverage?  No    Healthy Habits:     In general, how would you rate your overall health?  Good    Frequency of exercise:  None    Do you usually eat at least 4 servings of fruit and vegetables a day, include whole grains    & fiber and avoid regularly eating high fat or \"junk\" foods?  No    Taking medications regularly:  Yes    Medication side effects:  None    Ability to successfully perform activities of daily living:  No assistance needed    Home Safety:  No safety concerns identified    Hearing Impairment:  Difficulty following a conversation in a noisy restaurant or crowded room, difficult to understand a speaker at a public meeting or Rastafari service, need to ask people to speak up or repeat themselves and difficulty understanding soft or whispered speech    In the past 6 months, have you been bothered by leaking of urine? Yes    In general, how would you rate your overall mental or emotional health?  Good    Additional concerns today:  No          Have you ever done Advance Care Planning? (For example, a Health Directive, POLST, or a discussion with a medical provider or your loved ones about your wishes): No, advance care planning information given to patient to review.  Patient declined advance care planning discussion at this time.    Pt has hearing aids.    Fall risk     click delete button to remove this line now  Cognitive Screening   1) Repeat 3 items  2) Clock draw: NORMAL  3) 3 item recall: Recalls 2 objects   Results: NORMAL clock, 1-2 items recalled: COGNITIVE IMPAIRMENT LESS LIKELY    Mini-CogTM Copyright MARYANNE Robertson. Licensed by the author for use in [facility]; " "reprinted with permission ([email or web address]). All rights reserved.      Do you have sleep apnea, excessive snoring or daytime drowsiness? : no    Reviewed and updated as needed this visit by clinical staff   Tobacco  Allergies               Reviewed and updated as needed this visit by Provider                 Social History     Tobacco Use    Smoking status: Former     Packs/day: 0.00     Years: 5.00     Additional pack years: 0.00     Total pack years: 0.00     Types: Cigarettes     Start date: 1962     Quit date: 1966     Years since quittin.8    Smokeless tobacco: Never   Substance Use Topics    Alcohol use: Yes     Comment: Some             10/31/2023     8:17 AM   Alcohol Use   Prescreen: >3 drinks/day or >7 drinks/week? Not Applicable     Do you have a current opioid prescription? No  Do you use any other controlled substances or medications that are not prescribed by a provider? None    Remembered 2 words. Normal clock draw.      Concern about possible CAD.  His son-in-law and some friends of his have been diagnosed with coronary artery disease.  His son-in-law, who is 50, recently was found to have significant coronary artery disease and was told by his cardiologist \"if you would wait any longer you be dead.\"  This patient has a few seconds of chest pain when he bends over and ties his shoes but he does not have any chest pain or dyspnea when he goes upstairs, when he does work around the house, or when he goes on long walks with his wife which can be up to 4 to 5 miles.  He has a history of dyslipidemia but does not have hypertension.  He quit smoking 57 years ago.  He does not have diabetes.    Current providers sharing in care for this patient include:   Patient Care Team:  Lavell Fink MD as PCP - General (Family Medicine)  Monika Ely as   Celeste Hatch AuD as Audiologist (Audiology)  Celeste Hatch AuD as Audiologist (Audiology)  Lavell Fink MD as Assigned " "PCP    The following health maintenance items are reviewed in Epic and correct as of today:  Health Maintenance   Topic Date Due    COVID-19 Vaccine (1) Never done    ZOSTER IMMUNIZATION (1 of 2) Never done    RSV VACCINE 60+ (1 - 1-dose 60+ series) Never done    Pneumococcal Vaccine: 65+ Years (2 - PCV) 08/20/2013    DTAP/TDAP/TD IMMUNIZATION (4 - Td or Tdap) 09/13/2022    MEDICARE ANNUAL WELLNESS VISIT  02/01/2023    FALL RISK ASSESSMENT  02/01/2023    INFLUENZA VACCINE (1) 09/01/2023    LIPID  11/18/2024    ADVANCE CARE PLANNING  02/01/2027    PHQ-2 (once per calendar year)  Completed    IPV IMMUNIZATION  Aged Out    HPV IMMUNIZATION  Aged Out    MENINGITIS IMMUNIZATION  Aged Out    COLORECTAL CANCER SCREENING  Discontinued     Lab work is in process  Declined all vaccines.        Review of Systems   Constitutional:  Negative for chills and fever.   HENT:  Positive for hearing loss. Negative for congestion, ear pain and sore throat.    Eyes:  Negative for pain and visual disturbance.   Respiratory:  Negative for cough and shortness of breath.    Cardiovascular:  Positive for chest pain and peripheral edema. Negative for palpitations.   Gastrointestinal:  Positive for constipation. Negative for abdominal pain, diarrhea, heartburn, hematochezia and nausea.   Genitourinary:  Positive for frequency and impotence. Negative for dysuria, genital sores, hematuria, penile discharge and urgency.   Musculoskeletal:  Positive for arthralgias. Negative for joint swelling and myalgias.   Skin:  Negative for rash.   Neurological:  Negative for dizziness, weakness, headaches and paresthesias.   Psychiatric/Behavioral:  Negative for mood changes. The patient is not nervous/anxious.      Constitutional, HEENT, cardiovascular, pulmonary, gi and gu systems are negative, except as otherwise noted.    OBJECTIVE:   /85   Pulse 70   Temp 97.5  F (36.4  C) (Oral)   Resp 16   Ht 1.803 m (5' 11\")   Wt 104.8 kg (231 lb)   SpO2 " "99%   BMI 32.22 kg/m   Estimated body mass index is 32.22 kg/m  as calculated from the following:    Height as of this encounter: 1.803 m (5' 11\").    Weight as of this encounter: 104.8 kg (231 lb).  Physical Exam  Constitutional:       General: He is not in acute distress.     Appearance: Normal appearance. He is obese.   HENT:      Head: Normocephalic.      Nose: Nose normal.      Mouth/Throat:      Mouth: Mucous membranes are dry.   Cardiovascular:      Rate and Rhythm: Normal rate and regular rhythm.      Pulses: Normal pulses.      Heart sounds: No murmur heard.     No gallop.   Pulmonary:      Effort: No respiratory distress.      Breath sounds: Normal breath sounds. No stridor. No rhonchi or rales.   Abdominal:      General: Abdomen is flat.      Palpations: Abdomen is soft.   Feet:      Comments: 1+ edema bilat.  Skin:     Coloration: Skin is not jaundiced.             Comments: Lesion on the cheek; susp for basal cell.   Neurological:      General: No focal deficit present.      Mental Status: He is alert.           Diagnostic Test Results:  Labs reviewed in Epic    ASSESSMENT / PLAN:       ICD-10-CM    1. 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 Hyperlipidemia LDL goal <130  E78.5 pravastatin (PRAVACHOL) 10 MG tablet     CT Coronary Calcium Scan     Lipid panel reflex to direct LDL Fasting     Comprehensive metabolic panel     Lipid panel reflex to direct LDL Fasting     Comprehensive metabolic panel        Given his dyslipidemia advanced age and atypical chest pain we will get a CT calcium score.    Incidentally he has a lesion on his left cheek which is suspicious for skin cancer.  Would have him follow-up for shave biopsy of that.  Patient has been advised of split billing requirements and indicates understanding: " "Yes      COUNSELING:  Reviewed preventive health counseling, as reflected in patient instructions       Regular exercise       Healthy diet/nutrition       Fall risk prevention      BMI:   Estimated body mass index is 32.22 kg/m  as calculated from the following:    Height as of this encounter: 1.803 m (5' 11\").    Weight as of this encounter: 104.8 kg (231 lb).       He reports that he quit smoking about 57 years ago. His smoking use included cigarettes. He started smoking about 61 years ago. He has never used smokeless tobacco.      Appropriate preventive services were discussed with this patient, including applicable screening as appropriate for fall prevention, nutrition, physical activity, Tobacco-use cessation, weight loss and cognition.  Checklist reviewing preventive services available has been given to the patient.    Reviewed patients plan of care and provided an AVS. The Basic Care Plan (routine screening as documented in Health Maintenance) for Mike meets the Care Plan requirement. This Care Plan has been established and reviewed with the Patient.        Lavell Fink MD  [facility]    Identified Health Risks:  I have reviewed Opioid Use Disorder and Substance Use Disorder risk factors and made any needed referrals.   "

## 2023-11-01 NOTE — RESULT ENCOUNTER NOTE
The kidney blood test was just a little higher than 3 years ago.  I think this is just related to your age and not a big concern.  The cholesterol is high but you had been off of your cholesterol meds.  That should improve with the Pravastatin.

## 2023-11-06 ENCOUNTER — HOSPITAL ENCOUNTER (OUTPATIENT)
Dept: CT IMAGING | Facility: CLINIC | Age: 78
Discharge: HOME | End: 2023-11-06
Attending: FAMILY MEDICINE | Admitting: FAMILY MEDICINE
Payer: COMMERCIAL

## 2023-11-06 ENCOUNTER — TELEPHONE (OUTPATIENT)
Dept: FAMILY MEDICINE | Facility: CLINIC | Age: 78
End: 2023-11-06

## 2023-11-06 DIAGNOSIS — E78.5 HYPERLIPIDEMIA LDL GOAL <130: ICD-10-CM

## 2023-11-06 PROCEDURE — 75571 CT HRT W/O DYE W/CA TEST: CPT | Mod: 26 | Performed by: GENERAL ACUTE CARE HOSPITAL

## 2023-11-06 PROCEDURE — 75571 CT HRT W/O DYE W/CA TEST: CPT

## 2023-11-06 NOTE — TELEPHONE ENCOUNTER
Called pt with his lab results. Conveyed Dr Fink's notes about his results. Pt has no further questions or concerns at this time. Pt requested writer to update his preferred call back number to his cell phone instead of home phone.   CARLOS ALBERTO Haas, BSN

## 2023-11-06 NOTE — TELEPHONE ENCOUNTER
[facility] phone call message- general phone call:    Reason for call:    Patient would like a return phone call about his results.    Return call needed: Yes    OK to leave a message on voice mail? Yes    Primary language: English      needed? No    Call taken on November 6, 2023 at 2:15 PM by Amy Rene

## 2023-11-07 LAB
CV CALCIUM SCORE AGATSTON LM: 0
CV CALCIUM SCORING AGATSON LAD: 14
CV CALCIUM SCORING AGATSTON CX: 0
CV CALCIUM SCORING AGATSTON RCA: 0
CV CALCIUM SCORING AGATSTON TOTAL: 14

## 2023-11-08 DIAGNOSIS — E78.5 HYPERLIPIDEMIA LDL GOAL <130: Primary | ICD-10-CM

## 2023-11-08 RX ORDER — ROSUVASTATIN CALCIUM 10 MG/1
10 TABLET, COATED ORAL DAILY
Qty: 90 TABLET | Refills: 3 | Status: SHIPPED | OUTPATIENT
Start: 2023-11-08

## 2023-11-08 NOTE — RESULT ENCOUNTER NOTE
The calcium score was low.  This is good news.  You have a lower risk than most men your age of having a heart attack.  I would recommend a  stronger cholesterol medicine however.  Please stop the Pravastatin and start rosuvastatin instead.  I sen a prescription for that.  Let me know if you have questions.

## 2023-11-21 ENCOUNTER — OFFICE VISIT (OUTPATIENT)
Dept: FAMILY MEDICINE | Facility: CLINIC | Age: 78
End: 2023-11-21
Payer: COMMERCIAL

## 2023-11-21 VITALS
DIASTOLIC BLOOD PRESSURE: 84 MMHG | WEIGHT: 229 LBS | RESPIRATION RATE: 16 BRPM | HEART RATE: 60 BPM | OXYGEN SATURATION: 100 % | SYSTOLIC BLOOD PRESSURE: 122 MMHG | TEMPERATURE: 97.3 F | BODY MASS INDEX: 31.94 KG/M2

## 2023-11-21 DIAGNOSIS — L98.9 SKIN LESION: ICD-10-CM

## 2023-11-21 DIAGNOSIS — E78.5 HYPERLIPIDEMIA LDL GOAL <130: Primary | ICD-10-CM

## 2023-11-21 PROCEDURE — 99212 OFFICE O/P EST SF 10 MIN: CPT | Performed by: FAMILY MEDICINE

## 2023-11-21 NOTE — PROGRESS NOTES
"  Assessment & Plan     Hyperlipidemia LDL goal <130  Patient was being screened for significant coronary artery disease.  His CT calcium score was good with a total score of 14 putting him at the 6th percentile for his age and sex.  We discussed resuming statin which she has not been on for 2 weeks.  We should have him come back in 2 weeks time for a recheck of this lipid profile.    - Lipid Profile; Future    Skin lesion  We had wanted to do a shave biopsy today but the patient still taking his DOAC.  We had injected with lidocaine and epinephrine to see if it be possible to proceed with a shave biopsy anyway but he had about 3 minutes of oozing just from the injection.  I asked him to come back in 2 weeks for shave biopsy and to hold the apixaban for 48 hours prior to the procedure.      15 minutes spent by me on the date of the encounter doing chart review, review of test results, interpretation of tests, patient visit, and documentation        BMI:   Estimated body mass index is 31.94 kg/m  as calculated from the following:    Height as of 10/31/23: 1.803 m (5' 11\").    Weight as of this encounter: 103.9 kg (229 lb).       Lavell Fink MD  [facility]    Dasia Mckeon is a 78 year old, presenting for the following health issues:  shaved biopsy      11/21/2023     9:41 AM   Additional Questions   Roomed by Mao   Accompanied by self       HPI   We discussed his CT calcium score.  His calcium score was 14 which puts him at the 6th percentile based on age and sex.  We discussed that this is a very good thing and that it places him at a very low risk compared to other men his age.  I did point out that this does not mean that he will never have heart disease but that he is a much lower risk than most people.  Additionally, he has no chest pain, pressure, or tightness and no dyspnea with modest exertion.    Review of Systems   Constitutional, HEENT, cardiovascular, pulmonary, gi and gu " systems are negative, except as otherwise noted.      Objective    /84   Pulse 60   Temp 97.3  F (36.3  C) (Oral)   Resp 16   Wt 103.9 kg (229 lb)   SpO2 100%   BMI 31.94 kg/m    Body mass index is 31.94 kg/m .  Physical Exam   GENERAL: healthy, alert and no distress

## 2023-12-19 ENCOUNTER — ANCILLARY PROCEDURE (OUTPATIENT)
Dept: GENERAL RADIOLOGY | Facility: CLINIC | Age: 78
End: 2023-12-19
Attending: FAMILY MEDICINE
Payer: COMMERCIAL

## 2023-12-19 ENCOUNTER — OFFICE VISIT (OUTPATIENT)
Dept: FAMILY MEDICINE | Facility: CLINIC | Age: 78
End: 2023-12-19
Payer: COMMERCIAL

## 2023-12-19 VITALS
WEIGHT: 230 LBS | TEMPERATURE: 97.2 F | RESPIRATION RATE: 16 BRPM | HEART RATE: 59 BPM | SYSTOLIC BLOOD PRESSURE: 137 MMHG | DIASTOLIC BLOOD PRESSURE: 82 MMHG | BODY MASS INDEX: 32.08 KG/M2 | OXYGEN SATURATION: 97 %

## 2023-12-19 DIAGNOSIS — M25.561 ACUTE PAIN OF RIGHT KNEE: ICD-10-CM

## 2023-12-19 DIAGNOSIS — L98.9 SKIN LESION: Primary | ICD-10-CM

## 2023-12-19 PROCEDURE — 99213 OFFICE O/P EST LOW 20 MIN: CPT | Mod: 25 | Performed by: FAMILY MEDICINE

## 2023-12-19 PROCEDURE — 11102 TANGNTL BX SKIN SINGLE LES: CPT | Performed by: FAMILY MEDICINE

## 2023-12-19 PROCEDURE — 73560 X-RAY EXAM OF KNEE 1 OR 2: CPT | Mod: TC | Performed by: RADIOLOGY

## 2023-12-19 PROCEDURE — 88305 TISSUE EXAM BY PATHOLOGIST: CPT | Performed by: PATHOLOGY

## 2023-12-19 NOTE — PROGRESS NOTES
"  Assessment & Plan     Skin lesion  - CO TANGENTIAL BIOPSY OF SKIN, FIRST/SINGLE LESION  - SHAV SKIN LESION FACE/EARS <=0.5 CM  - Surgical Pathology Exam    Acute pain of right knee  We will get x-rays today.  Continue with the Voltaren gel and let us know if he is not improving.  - XR Knee Right 1/2 Views; Future      BMI:   Estimated body mass index is 32.08 kg/m  as calculated from the following:    Height as of 10/31/23: 1.803 m (5' 11\").    Weight as of this encounter: 104.3 kg (230 lb).           No follow-ups on file.    Lavell Fink MD  [facility]    Dasia Mckeon is a 78 year old, presenting for the following health issues:  shaved biopsy      12/19/2023     9:56 AM   Additional Questions   Roomed by Mao   Accompanied by self       HPI   Patient injured his knee about 2 weeks ago.  More accurately, his knee started hurting about 2 weeks ago.  He did not recall any specific trauma.  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  He went up and down ladders and was extending to reach things but, again, there is no injury.  It swelled at first and now is gotten slowly better.  He has been using Voltaren gel at home.    He is also here today to get a shave biopsy of a suspicious skin lesion on the face.    Review of Systems   Constitutional, HEENT, cardiovascular, pulmonary, gi and gu systems are negative, except as otherwise noted.      Objective    /82   Pulse 59   Temp 97.2  F (36.2  C) (Oral)   Resp 16   Wt 104.3 kg (230 lb)   SpO2 97%   BMI 32.08 kg/m    Body mass index is 32.08 kg/m .  Physical Exam   Knee: There is no joint line tenderness.  There is no deformity or bruising.  Minimal swelling.  Procedure note:    After obtaining informed written consent the area on the left cheek was anesthetized with 1% lidocaine with epinephrine and then a shave biopsy was accomplished.  This will be sent for pathology.  The lesion was no pain with " valgus or varus stress.       Procedure note.    After obtaining informed written consent the area on the left cheek was anesthetized with 1% lidocaine with epinephrine.  Shave biopsy was accomplished and sent for pathology.  Lesion was 3 x 4 mm and is suspicious for basal cell carcinoma.

## 2023-12-20 LAB
PATH REPORT.COMMENTS IMP SPEC: NORMAL
PATH REPORT.COMMENTS IMP SPEC: NORMAL
PATH REPORT.FINAL DX SPEC: NORMAL
PATH REPORT.GROSS SPEC: NORMAL
PATH REPORT.MICROSCOPIC SPEC OTHER STN: NORMAL
PATH REPORT.RELEVANT HX SPEC: NORMAL
PHOTO IMAGE: NORMAL

## 2023-12-20 NOTE — RESULT ENCOUNTER NOTE
This did show skin cancer.  Please follow up in 2 weeks so we can recheck that and see if we got it all.    DB

## 2023-12-20 NOTE — RESULT ENCOUNTER NOTE
Dear Mike,     Your x-ray shows severe arthritis.  I would recommend Tylenol and Voltaren gel.  If this is not helping we can do a knee injection.  If you still are not getting better you could see an orthopedic surgeon and consider having a knee replacement.    CECI Andrade

## 2024-01-02 ENCOUNTER — OFFICE VISIT (OUTPATIENT)
Dept: FAMILY MEDICINE | Facility: CLINIC | Age: 79
End: 2024-01-02
Payer: COMMERCIAL

## 2024-01-02 VITALS
RESPIRATION RATE: 16 BRPM | BODY MASS INDEX: 33.05 KG/M2 | DIASTOLIC BLOOD PRESSURE: 84 MMHG | SYSTOLIC BLOOD PRESSURE: 137 MMHG | WEIGHT: 237 LBS | OXYGEN SATURATION: 98 % | HEART RATE: 98 BPM | TEMPERATURE: 96.4 F

## 2024-01-02 DIAGNOSIS — C44.320 SQUAMOUS CELL CARCINOMA OF SKIN OF FACE: Primary | ICD-10-CM

## 2024-01-02 DIAGNOSIS — M17.31 POST-TRAUMATIC OSTEOARTHRITIS OF RIGHT KNEE: ICD-10-CM

## 2024-01-02 DIAGNOSIS — C44.310 BCC (BASAL CELL CARCINOMA), FACE: ICD-10-CM

## 2024-01-02 PROCEDURE — 99213 OFFICE O/P EST LOW 20 MIN: CPT | Performed by: FAMILY MEDICINE

## 2024-01-02 RX ORDER — RESPIRATORY SYNCYTIAL VIRUS VACCINE 120MCG/0.5
0.5 KIT INTRAMUSCULAR ONCE
Qty: 1 EACH | Refills: 0 | Status: CANCELLED | OUTPATIENT
Start: 2024-01-02 | End: 2024-01-02

## 2024-01-02 NOTE — PATIENT INSTRUCTIONS
Schedule the visit to remove two spots on the face.  Do no take the blood thinners for at least 48 hours beforehand.    The cream that we can use later to remove precancers is called 5FU.

## 2024-01-02 NOTE — PROGRESS NOTES
"  Assessment & Plan     Squamous cell carcinoma of skin of face  Will schedule a 40-minute visit and remove both lesions during that time.  He will be off his Eliquis for at least 48 hours prior to that.    BCC (basal cell carcinoma), face    He is doing fine from the knee arthritis standpoint.  He can continue with his topical anti-inflammatories.     BMI:   Estimated body mass index is 33.05 kg/m  as calculated from the following:    Height as of 10/31/23: 1.803 m (5' 11\").    Weight as of this encounter: 107.5 kg (237 lb).       Lavell Fink MD  [facility]    Dasia Mckeon is a 78 year old, presenting for the following health issues:  Results (Follow up xrays of knee and skin )      1/2/2024     8:38 AM   Additional Questions   Roomed by Harry   Accompanied by self       HPI   Today to follow-up on his knee x-ray and also on his shave biopsy.    X-ray was consistent with moderate osteoarthritis.  At this point he is doing fairly well with topical treatments and does not want to proceed with any injections, etc.    Shave biopsy from the right cheek was consistent with squamous cell carcinoma and will need to do an excisional biopsy to remove that.  He also points to another lesion behind his right ear and this looks like a basal cell carcinoma.        Objective    /84   Pulse 98   Temp (!) 96.4  F (35.8  C) (Tympanic)   Resp 16   Wt 107.5 kg (237 lb)   SpO2 98%   BMI 33.05 kg/m    Body mass index is 33.05 kg/m .  Physical Exam   Alert and oriented.  The area where the shave biopsy was accomplished on the right cheek is healing fine.  He has what appears to be a basal cell carcinoma behind the right ear.  It is a raised lesion about 4 x 4 mm with central umbilication.        "

## 2024-01-23 ENCOUNTER — OFFICE VISIT (OUTPATIENT)
Dept: FAMILY MEDICINE | Facility: CLINIC | Age: 79
End: 2024-01-23
Payer: COMMERCIAL

## 2024-01-23 VITALS
WEIGHT: 233 LBS | HEART RATE: 65 BPM | BODY MASS INDEX: 32.62 KG/M2 | SYSTOLIC BLOOD PRESSURE: 149 MMHG | RESPIRATION RATE: 16 BRPM | TEMPERATURE: 97.4 F | HEIGHT: 71 IN | DIASTOLIC BLOOD PRESSURE: 83 MMHG | OXYGEN SATURATION: 98 %

## 2024-01-23 DIAGNOSIS — Z29.11 NEED FOR VACCINATION AGAINST RESPIRATORY SYNCYTIAL VIRUS: ICD-10-CM

## 2024-01-23 DIAGNOSIS — R03.0 ELEVATED BLOOD PRESSURE READING WITHOUT DIAGNOSIS OF HYPERTENSION: ICD-10-CM

## 2024-01-23 DIAGNOSIS — C44.310 BCC (BASAL CELL CARCINOMA), FACE: ICD-10-CM

## 2024-01-23 DIAGNOSIS — C44.320 SQUAMOUS CELL CARCINOMA OF SKIN OF FACE: Primary | ICD-10-CM

## 2024-01-23 DIAGNOSIS — Z23 NEED FOR TDAP VACCINATION: ICD-10-CM

## 2024-01-23 PROCEDURE — 11441 EXC FACE-MM B9+MARG 0.6-1 CM: CPT | Performed by: FAMILY MEDICINE

## 2024-01-23 PROCEDURE — 88305 TISSUE EXAM BY PATHOLOGIST: CPT | Performed by: DERMATOLOGY

## 2024-01-23 PROCEDURE — 11104 PUNCH BX SKIN SINGLE LESION: CPT | Mod: 59 | Performed by: FAMILY MEDICINE

## 2024-01-23 RX ORDER — RESPIRATORY SYNCYTIAL VIRUS VACCINE 120MCG/0.5
0.5 KIT INTRAMUSCULAR ONCE
Qty: 1 EACH | Refills: 0 | Status: CANCELLED | OUTPATIENT
Start: 2024-01-23 | End: 2024-01-23

## 2024-01-23 NOTE — PROGRESS NOTES
"  Assessment & Plan     Squamous cell carcinoma of skin of face    - Surgical Pathology Exam    BCC (basal cell carcinoma), face    - Surgical Pathology Exam      Elevated blood pressure reading without diagnosis of hypertension  Patient had an episode this morning that was fairly short-lived.  It does not sound particularly serious or significant.  His blood pressure is a little bit high and this could be because he is anxious.  I reviewed his recent blood pressures and they have been fine.  I do not think any changes need to be made today.        BMI  Estimated body mass index is 32.5 kg/m  as calculated from the following:    Height as of this encounter: 1.803 m (5' 11\").    Weight as of this encounter: 105.7 kg (233 lb).       Subjective   Mike is a 78 year old, presenting for the following health issues:  remove spots on face      1/23/2024     7:58 AM   Additional Questions   Roomed by Harry   Accompanied by self     HPI   Patient is coming today for procedure which we did end up doing but he also wanted to talk about feeling very lightheaded this morning.  He states he had felt a little bit \"fuzzy\" and this lasted a few minutes.  He did not have new or unilateral numbness or weakness in the extremities.  He did not have any alteration of his speech.  He did not have blurred or double vision.  He did have a headache.  This resolved on its own.    He notes that his blood pressure is low but high for him today and that is true.  He does not have hypertension and he is otherwise now feeling fine.  He does not have chest pain or pressure.  He does not have shortness of breath.      Review of Systems  Constitutional, HEENT, cardiovascular, pulmonary, gi and gu systems are negative, except as otherwise noted.      Objective    BP (!) 149/83   Pulse 65   Temp 97.4  F (36.3  C) (Oral)   Resp 16   Ht 1.803 m (5' 11\")   Wt 105.7 kg (233 lb)   SpO2 98%   BMI 32.50 kg/m    Body mass index is 32.5 kg/m .  Physical " Exam   GENERAL: alert and no distress  NECK: no adenopathy, no asymmetry, masses, or scars  RESP: lungs clear to auscultation - no rales, rhonchi or wheezes  CV: Is regular at this time.  No murmur appreciated.  MS: no gross musculoskeletal defects noted, no edema      Signed Electronically by: Lavell Fink MD        Procedure note:    After obtaining informed written consent the skin was prepped and anesthetized with 1% lidocaine with epinephrine and then an elliptical incision was made under the left eye to remove the known squamous cell carcinoma.  I had inked out appropriate orders.  This was closed with several interrupted 5-0 Ethilon sutures.    There is an area suspicious for basal cell carcinoma behind the right ear and because of its proximity to the ear I did a punch biopsy to remove the lesion with a millimeter of margin around it and did not want to do a larger excision as this could have cosmetic implications.  If it is basal cell and the margins were not clear consideration should be given to Mohs surgery.

## 2024-01-24 LAB
PATH REPORT.COMMENTS IMP SPEC: ABNORMAL
PATH REPORT.COMMENTS IMP SPEC: ABNORMAL
PATH REPORT.COMMENTS IMP SPEC: YES
PATH REPORT.FINAL DX SPEC: ABNORMAL
PATH REPORT.GROSS SPEC: ABNORMAL
PATH REPORT.MICROSCOPIC SPEC OTHER STN: ABNORMAL
PATH REPORT.RELEVANT HX SPEC: ABNORMAL
PHOTO IMAGE: ABNORMAL

## 2024-01-29 ENCOUNTER — OFFICE VISIT (OUTPATIENT)
Dept: FAMILY MEDICINE | Facility: CLINIC | Age: 79
End: 2024-01-29
Payer: COMMERCIAL

## 2024-01-29 ENCOUNTER — TELEPHONE (OUTPATIENT)
Dept: DERMATOLOGY | Facility: CLINIC | Age: 79
End: 2024-01-29

## 2024-01-29 VITALS
DIASTOLIC BLOOD PRESSURE: 80 MMHG | OXYGEN SATURATION: 97 % | BODY MASS INDEX: 32.9 KG/M2 | TEMPERATURE: 97.3 F | HEART RATE: 74 BPM | RESPIRATION RATE: 16 BRPM | SYSTOLIC BLOOD PRESSURE: 114 MMHG | HEIGHT: 71 IN | WEIGHT: 235 LBS

## 2024-01-29 DIAGNOSIS — C44.320 SQUAMOUS CELL CARCINOMA OF SKIN OF FACE: Primary | ICD-10-CM

## 2024-01-29 DIAGNOSIS — Z48.01 ENCOUNTER FOR CHANGE OR REMOVAL OF SURGICAL WOUND DRESSING: ICD-10-CM

## 2024-01-29 PROCEDURE — 99207 PR NO BILLABLE SERVICE THIS VISIT: CPT | Mod: GC

## 2024-01-29 RX ORDER — RESPIRATORY SYNCYTIAL VIRUS VACCINE 120MCG/0.5
0.5 KIT INTRAMUSCULAR ONCE
Qty: 1 EACH | Refills: 0 | Status: CANCELLED | OUTPATIENT
Start: 2024-01-29 | End: 2024-01-29

## 2024-01-29 NOTE — PROGRESS NOTES
"Preceptor Attestation:  Vitals:    01/29/24 0802   BP: 114/80   Pulse: 74   Resp: 16   Temp: 97.3  F (36.3  C)   TempSrc: Oral   SpO2: 97%   Weight: 106.6 kg (235 lb)   Height: 1.803 m (5' 11\")          I discussed the patient with the resident and evaluated the patient in person. I was present for and supervised the entire procedure. I have verified the content of the note, which accurately reflects my assessment of the patient and the plan of care.   Supervising Physician:  Lisbet Landry MD    "

## 2024-01-29 NOTE — PROGRESS NOTES
"  Assessment & Plan     Squamous cell carcinoma of skin of face  Initially presented on 12/19/2023 for shave biopsy of lesions under left eye and right ear.  Findings of shave biopsy were consistent with squamous cell carcinoma.  Patient returned for surgical excision on 1/23/2024.  Reviewed surgical pathology results.  Lesion of left face showed squamous cell carcinoma, well-differentiated and excised in the planes examined.  Lesion of right face show squamous cell carcinoma, well-differentiated but extending into the lateral margin.  Given right facial lesion is extending into the lateral margin, will refer to dermatology for consideration of further excision.  - Adult Dermatology  Referral; Future    Encounter for change or removal of surgical wound dressing  Status post surgical excision x2 on 1/23/2024.  Wounds were closed with several interrupted 5-0 Ethilon sutures per Dr. Fink's procedure note.  - Suture Removal No Charge.  See procedure note.    Return in about 3 months (around 4/29/2024) for Follow up.    Dasia Mckeon is a 78 year old, presenting for the following health issues:  Suture Removal (Under L eye and behind R ear)      1/29/2024     8:01 AM   Additional Questions   Roomed by Mao   Accompanied by self     HPI   Patient presenting today for removal of sutures.  These were placed on 1/23/2024 following excision of 2 lesions.  Patient overall feeling well with no questions or concerns.  He recalls PCP discussing possibility of cream for other precursor lesions on his face.      Review of Systems  CONSTITUTIONAL: NEGATIVE for fever, chills, change in weight  ENT/MOUTH: NEGATIVE for ear, mouth and throat problems  RESP: NEGATIVE for significant cough or SOB  CV: NEGATIVE for chest pain, palpitations or peripheral edema      Objective    /80   Pulse 74   Temp 97.3  F (36.3  C) (Oral)   Resp 16   Ht 1.803 m (5' 11\")   Wt 106.6 kg (235 lb)   SpO2 97%   BMI 32.78 kg/m    Body " mass index is 32.78 kg/m .  Physical Exam   GENERAL: Alert and no distress.  SKIN: Two well-healed incisions, 1 below left eye and 1 below right ear, with sutures in place.  Mild scabbing/scarring present otherwise no drainage or surrounding erythema/edema.  Skin edges well-approximated following removal of stitches.  NEURO: Normal strength and tone, mentation intact and speech normal.  PSYCH: Mentation appears normal, affect normal/bright.    Office Visit on 01/23/2024   Component Date Value Ref Range Status    Case Report 01/23/2024    Final                    Value:Surgical Pathology Report                         Case: [id number]                                  Authorizing Provider:  Lavell Fink MD         Collected:           01/23/2024 08:46 AM          Ordering Location:     [facility]   Received:            01/23/2024 09:58 AM                                 [place]                                                                     Pathologist:           Amish Beck MD                                                       Specimens:   A) - Skin, right face                                                                               B) - Skin, left face                                                                       Final Diagnosis 01/23/2024    Final                    Value:This result contains rich text formatting which cannot be displayed here.    Clinical Information 01/23/2024    Final                    Value:This result contains rich text formatting which cannot be displayed here.    Gross Description 01/23/2024    Final                    Value:This result contains rich text formatting which cannot be displayed here.    Microscopic Description 01/23/2024    Final                    Value:This result contains rich text formatting which cannot be displayed here.    MCRS 01/23/2024 Yes (A)  N/A Final    Performing Labs 01/23/2024    Final                    Value:This  result contains rich text formatting which cannot be displayed here.           Signed Electronically by: LEXUS WHITLEY MD

## 2024-01-29 NOTE — PROGRESS NOTES
S: Patient is here today for suture removal.  The patient reports no complications with wound.  Sutures were placed 3 days ago.  Sutures were placed at [facility].    o: Wound is well healed, no signs of secondary infection.  Sutures removed without complication.    A: Laceration    P: Sutures removed, F/U PRN.

## 2024-01-29 NOTE — TELEPHONE ENCOUNTER
Left patient a voicemail to schedule a consult & mohs for   excision of two lesions positive for squamous cell carcinoma, right excision with incomplete borders        with Dr. Hodges or Dr. Peters. Provided my direct phone number.    Tracey Kayla on 1/29/2024 at 4:19 PM

## 2024-01-31 NOTE — TELEPHONE ENCOUNTER
Called patient to schedule surgery with Dr. Hodges    Date of Surgery: 03/13    Surgery type: mohs    Consult scheduled: Yes    Has patient had mohs with us before? No    Additional comments: glendy Garza on 1/31/2024 at 8:27 AM

## 2024-03-05 ENCOUNTER — OFFICE VISIT (OUTPATIENT)
Dept: DERMATOLOGY | Facility: CLINIC | Age: 79
End: 2024-03-05
Payer: COMMERCIAL

## 2024-03-05 ENCOUNTER — PRE VISIT (OUTPATIENT)
Dept: DERMATOLOGY | Facility: CLINIC | Age: 79
End: 2024-03-05

## 2024-03-05 DIAGNOSIS — C44.329 SQUAMOUS CELL CANCER OF SKIN OF RIGHT CHEEK: ICD-10-CM

## 2024-03-05 DIAGNOSIS — C44.329 SQUAMOUS CELL CANCER OF SKIN OF LEFT CHEEK: Primary | ICD-10-CM

## 2024-03-05 PROCEDURE — 99204 OFFICE O/P NEW MOD 45 MIN: CPT | Mod: GC | Performed by: DERMATOLOGY

## 2024-03-05 NOTE — LETTER
3/5/2024       RE: Mike Santiago  [address]   [place] [address]     Dear Colleague,    Thank you for referring your patient, Mike Santiago, to the [facility] [place] at [facility]. Please see a copy of my visit note below.    Dermatologic Surgery Consultation Note    Dermatology Problem List:  History of NMSC  - SCC, right face (right infraauricular cheek), s/p shave 1/23/24, exc vs MMS scheduled 3/13/24 @ 9a [facility]  - SCC, left face (left infraorbital cheek), s/p shave 12/19/23, s/p shave 1/23/24, exc vs MMS scheduled 3/13/24 @ 9a [facility]    CC: Consult For (SCC mohs)    Subjective: Mike Santiago is a 78 year old male who presents today for Mohs micrographic surgery consultation for a recent diagnosis of skin cancer.  - Skin cancer(s): SCC  - Location(s): right face, left face  - biopsies done by PCP. Hasn't seen derm before. Wondering about treatment for the other scaly lesions on his cheek  - on Xarelto  - no other concerns today    Objective: An exam of the face was performed today   - L infraorbital cheek- 0.9 x 0.8 cm pink firm papule   - R infraauricular cheek- 0.7 x 0.7 cm pink firm papule   (Confirmed with patient that these were the locations of the recent biopsies labeled left face and right face)          Path report:   Case: [id number]     Collected: 01/23/2024     Final Diagnosis   A(1). Right face:  - Squamous cell carcinoma, well differentiated, extending to the lateral margin - (see description)      B(2). Left face:  - Squamous cell carcinoma, well-differentiated, excised in the planes examined - (see description)       Assessment and Plan:     1. Plan for Mohs micrographic surgery for skin cancer(s) above:  *Review lab result(s): Dermpath report   - We discussed the nature of the diagnosis/condition above. We discussed the treatment options, including the risks benefits and expectations of these  options. We recommend micrographic surgery as the most effective and most tissue sparing option for treatment, and the patient agrees to proceed with this.  The patient is aware of the risks, benefits and expectations of this procedure. The patient will be scheduled for this procedure, if not already done so.  - We anticipate the following closure type: complex linear closure vs. Sliding or lifting flap  - referral placed to general dermatology for FBSE and AK management     Patient was discussed with and evaluated by attending physician Dr. Joseph Hodges.    Staff Involved:   Scribe/Fellow/Staff    Scribe Disclosure:   I, ARIAS BELTRÁN, am serving as a scribe; to document services personally performed by Joseph Hodges MD -based on data collection and the provider's statements to me.     Anamaria Gomes MD  Micrographic Surgery and Dermatologic Oncology (MSDO) Fellow, PGY-5    Attending Attestation  I attest that the Fellow recorded the interview and exam that I personally performed.  I have reviewed the note and edited it as necessary.    Joseph Hodges M.D.  Professor  Director of Dermatologic Surgery  Department of Dermatology  [facility]

## 2024-03-05 NOTE — TELEPHONE ENCOUNTER
FUTURE VISIT INFORMATION      FUTURE VISIT INFORMATION:  Date: 3.5.24  Time: 2:15  Location: [facility]  REFERRAL INFORMATION:  Referring provider:  Dana[facility]  Referring providers clinic:  [facility]  Reason for visit/diagnosis  excision of two lesions positive for squamous cell carcinoma, right excision with incomplete borders      RECORDS REQUESTED FROM:       Clinic name Comments Records Status Imaging Status   [facility] 1.29.24  [place]    1.23.24, 12.19.23  [facility]  Path # [id number], [id number] Epic No photos

## 2024-03-05 NOTE — PROGRESS NOTES
Dermatologic Surgery Consultation Note    Dermatology Problem List:  History of NMSC  - SCC, right face (right infraauricular cheek), s/p shave 1/23/24, exc vs MMS scheduled 3/13/24 @ 9a [facility]  - SCC, left face (left infraorbital cheek), s/p shave 12/19/23, s/p shave 1/23/24, exc vs MMS scheduled 3/13/24 @ 9a [facility]    CC: Consult For (SCC mohs)    Subjective: Mike Santiago is a 78 year old male who presents today for Mohs micrographic surgery consultation for a recent diagnosis of skin cancer.  - Skin cancer(s): SCC  - Location(s): right face, left face  - biopsies done by PCP. Hasn't seen derm before. Wondering about treatment for the other scaly lesions on his cheek  - on Xarelto  - no other concerns today    Objective: An exam of the face was performed today   - L infraorbital cheek- 0.9 x 0.8 cm pink firm papule   - R infraauricular cheek- 0.7 x 0.7 cm pink firm papule   (Confirmed with patient that these were the locations of the recent biopsies labeled left face and right face)          Path report:   Case: [id number]     Collected: 01/23/2024     Final Diagnosis   A(1). Right face:  - Squamous cell carcinoma, well differentiated, extending to the lateral margin - (see description)      B(2). Left face:  - Squamous cell carcinoma, well-differentiated, excised in the planes examined - (see description)       Assessment and Plan:     1. Plan for Mohs micrographic surgery for skin cancer(s) above:  *Review lab result(s): Dermpath report   - We discussed the nature of the diagnosis/condition above. We discussed the treatment options, including the risks benefits and expectations of these options. We recommend micrographic surgery as the most effective and most tissue sparing option for treatment, and the patient agrees to proceed with this.  The patient is aware of the risks, benefits and expectations of this procedure. The patient will be scheduled for this procedure, if not already done so.  - We anticipate the  following closure type: complex linear closure vs. Sliding or lifting flap  - referral placed to general dermatology for FBSE and AK management     Patient was discussed with and evaluated by attending physician Dr. Joseph Hodges.    Staff Involved:   Scribe/Fellow/Staff    Scribe Disclosure:   I, ARIAS JEREL, am serving as a scribe; to document services personally performed by Joseph Hodges MD -based on data collection and the provider's statements to me.     Aanmaria Gomes MD  Micrographic Surgery and Dermatologic Oncology (MSDO) Fellow, PGY-5    Attending Attestation  I attest that the Fellow recorded the interview and exam that I personally performed.  I have reviewed the note and edited it as necessary.    Joseph Hodges M.D.  Professor  Director of Dermatologic Surgery  Department of Dermatology  [facility]

## 2024-03-05 NOTE — PATIENT INSTRUCTIONS
In advance of your upcoming dermatologic surgery appointment, we wanted to send you a patient handout (see below) on Mohs micrographic surgery, so that you may have an idea of what to expect. This handout is quite detailed as it contains many of the most frequently asked questions that our patients ask.    Mohs Surgery Quick Tips    Please reserve the half day that you are with us and do not schedule any other appointments the morning of your surgery date. We cannot guarantee what time the surgery will be done as it will depend on how many times the Mohs surgeon has to go back and remove more tissue to completely remove your skin cancer.  If you suspect you will experience excess anxiety or claustrophobia during the procedure, please let the Mohs surgeon know prior to surgery to allow us time to address this. If an anti-anxiety medication is required, then you will need a designated  to drive you home.  Mohs surgery is done under local anesthesia, so you should eat breakfast and take your regularly scheduled medications. You do NOT need to fast.  Wear comfortable, loose-fitting clothing that can easily be taken off and put back on before and after surgery.  Most of your time with us will be spent waiting for tissue to be processed and carefully looked at under the microscope by the Mohs surgeon. Bring with you a book, tablet, or smartphone that you can use to spend the waiting time. You are allowed to eat lunch.  You should have a designated  if surgery will involve an area that can occlude vision. This is because you can get swelling/bruising immediately after surgery and will go home with a bulky bandage that could obstruct your view of driving safely.  In most cases, you will go home with a bulky pressure dressing over the site that will need to remain on, clean, and dry for the first 48 hours. You will not be able to get the site wet for the first 48 hours. This bulky pressure dressing is to help  prevent post-op bleeding. Your nurse will provide detailed wound care instructions verbally and in writing on the day of surgery.  The overwhelming majority of patients manage their normal post-op pain with Tylenol alternating with ibuprofen.   Any time the skin is cut surgically, including with Mohs surgery, a scar forms. Scars are unavoidable but are minimized with the Mohs surgery approach. A scar is thought to be better than a skin cancer that could become a serious risk to your future health. The goal with Mohs surgery is for the scar to be barely noticeable by an acquaintance talking with you at a normal conversational distance (say, 4-5 feet away) by 3 months after your surgery. In the meantime, please be patient as it takes about 3 months for scars to mature in appearance and begin to fade.  Do NOT wear make-up on the day of surgery if the skin cancer is on your face.  Do NOT use Neosporin on your wound. It is not effective at preventing infections and can lead to irritation similar to an allergic reaction at the wound site.  Do not use harsh soaps like Dial soap or [place] Spring on your wound as this will also lead to irritation.    What is  Mohs micrographic surgery ?   Mohs micrographic surgery is considered the most effective treatment for many skin cancers. It is named after the late Dr. Frederic Mohs, who pioneered this technique. Note that  Mohs  surgery is not an abbreviation or acronym, but is named after Dr. Mohs.    Overview of Mohs Micrographic Surgery  Skin cancer often resembles the  tip of the iceberg  with more tumor cells growing downward and outward into the skin like the roots of a tree. These  roots  are not visible with the naked eye, but instead can be seen under a microscope. With the Mohs technique, the dermatologic surgeon can precisely identify and remove an entire tumor while leaving the surrounding healthy tissue intact and unharmed. Mohs surgery has the highest success rate of all  treatments for many skin cancers - up to 99%.  Mohs Surgeons: Fellowship-trained Mohs surgeons are specially trained as a cancer surgeon, pathologist, and reconstructive surgeon all in one.    Advantages of Mohs Surgery  Mohs surgery is unique and so effective because of the way the removed tissue is microscopically examined, evaluating 100% of the surgical margins. The pathologic interpretation of the tissue margins is done on site by the Mohs surgeon, who is specially trained in the reading of these slides.  Advantages of Mohs surgery include:  - Ensuring complete cancer removal during surgery to provide the best cure rate and protect against cancer recurrence  - Minimizing the amount of healthy tissue lost  - Maximizing the functional and cosmetic outcome resulting from surgery  - Repairing the site of the cancer the same day the cancer is removed (in most cases)  - Curing skin cancer when other methods have failed  Other skin cancer treatment methods require the provider to often blindly estimate the amount of tissue to treat and remove, which can result in the unnecessary removal of healthy skin tissue, as well as the skin cancer coming back if any part of it is left behind.     Your Mohs Procedure  Mohs skin cancer surgery is an outpatient procedure, which takes place in our on-site surgical suite. Our suite is equipped with a dedicated Mohs laboratory for microscopic examination of tissue. Surgeries start early in the morning and are completed the same day, depending on the extent of the tumor and the amount of reconstruction necessary.  First, you will receive local anesthesia (i.e., injections with lidocaine anesthetic) around the area of the tumor, so you are awake during the entire procedure. The use of local anesthesia versus general anesthesia provides many benefits, including preventing a lengthy recovery, possible side effects from general anesthesia, and cost. You are completely numb in the area of  the surgery, however, so the procedure is comfortable.      After the area has been numbed, all visible tumor, along with a thin layer of surrounding tissue, is taken out with a scalpel blade. After this, patients are bandaged by our nursing staff and can rest in the reception area, cafe on the first floor at [facility], or can remain in the patient room while awaiting testing results. A specially trained technician prepares the tissue and puts it on slides for your Mohs surgeon to examine under a microscope. Waiting time can range from 60-90 minutes while the tissue is being processed. If cancer involving the edges of the removed tissue is seen by the Mohs surgeon under the microscope, then another layer of tissue is removed from the area where the cancer was detected using a scalpel blade. This way only cancerous tissue is targeted during the procedure, minimizing the loss of healthy tissue. These steps are repeated until no cancer is remaining. Most skin cancers require 1 to 3 rounds for complete removal (all performed on the same day). Aggressive cancers can take several rounds of surgery to cure. Sometimes skin cancer can appear to be small to the naked eye but in fact is larger in reality. This means that the wound left behind after skin cancer removal could be larger than what you may expect, but remember that this may be necessary to remove all the skin cancer present. You do not want any skin cancer to be left behind as that will lead to the skin cancer coming back within months to years and may require a larger surgery.    After Your Skin Cancer is Removed  When your surgery is complete, your Mohs surgeon, who has expertise in reconstructive surgery, assesses the wound and discusses your options for the best functional and cosmetic outcome.      Closing the wound:  Depending on the size of the wound and what your Mohs surgeon decides, your wound will be closed using one of the following options:  Letting the  wound heal on its own from the edges and without stitches  Using stitches to close the wound in a line  Using stitches to close the wound by shifting skin from a nearby area of skin (called a  skin flap )  Using stitches to place a skin graft from another part of the body on the wound     Every wound is specially managed to maximize the functional and cosmetic outcome. For reconstruction on the head, we take great care to make sure stitches, if needed, do not interfere with the resting positions of the eyelids, nose, mouth, and ears, so we sometimes have to use facial plastic surgery techniques to close wounds. Patients are sometimes surprised by how many stitches are used, but this is because we want to do a good job at carefully lining up the wound edges to minimize scarring and prevent the wound from opening up later on, which could lead to an infection. We try to use stitches that self-dissolve whenever possible. In most cases, you will go home with a bulky pressure dressing over the surgical site that will need to remain on, clean, and dry for the first 48 hours. You will not be able to get the site wet for the first 48 hours. This bulky pressure dressing is to help prevent post-op bleeding. Your nurse will give you detailed wound care instructions verbally and in writing on the day of your surgery.     Post-op pain  Tylenol and ibuprofen are sufficient for pain control for the overwhelming majority of patients. If you have been told by another physician not to take Tylenol (also called acetaminophen) or ibuprofen (also called Motrin or Advil), then let your Mohs surgeon know. We typically suggest taking Tylenol 1,000 mg (i.e., two extra strength tabs) every 8 hours. In between those doses of Tylenol, you can take ibuprofen 400 mg (two tabs) every eight hours. This essentially means that you would take either Tylenol or ibuprofen alternating every four hours. Do NOT take more than 4,000 mg of Tylenol or 3,200  mg of ibuprofen per 24 hour period. Icing for 15 minutes every hour will help with pain and swelling as well, and it is especially helpful for surgeries around the eyes. Keeping the wound area elevated will also help with swelling and pain. If your wound is on your lower leg, then wearing compression stockings can reduce swelling and speed healing.     Expectations About Surgical Scars  Any time the skin is cut, the body forms a scar. This is normal and natural, and a scar is thought to be better than a skin cancer that could become a serious risk to your health. The goal with Mohs surgery is for the scar to be barely noticeable by an acquaintance talking with you at a normal conversational distance (say, 4-5 feet away) by 3 months after your surgery. In the meantime, please be patient as it takes about 3 months for scars to mature in appearance and begin to fade. It is important to wear sunscreen over a scar starting about 1 month after surgery as scars do not tan normally and can become discolored, compared with the surrounding skin, after sun exposure. Additionally, gentle scar massage can often be started about 1 month after surgery. This means gentle, kneading massage on the scar for a couple minutes several times a day. After three months of waiting to ensure all inflammation has resolved and the scar has matured, we are happy to see you if you have persistent concerns with scarring. On rare occasions, we perform steroid injections or use a laser to treat a scar. These types of treatments are not  one-and-done  and multiple sessions are usually necessary to help scar appearance.    Risks:    Scar formation at the site of tumor removal. You may need further treatment with steroid injections/lasers for scarring.  Larger than expected wound created upon removal of the skin cancer.   Poor wound healing, which may be due to the patient's underlying health conditions or failure of the wound repair method.    Excessive bleeding from the wound, which could affect wound healing and/or result in the need for more office visits.    Wound that becomes infected (an uncommon occurrence, minimized by using sterile technique).   Loss of nerve function (muscle or feeling) if a tumor invades a nerve, which can be temporary or permanent.   Regrowth of tumor after removal (more common with previously treated tumors and large, longstanding tumors).   Cosmetic or functional deformities if tumor is near or on an important structure such as eyes, eyelids, nose, ears, lips, forehead, scalp, fingers, or genital area.     If you have any questions, please feel free to contact us.  During business hours (M-F 8:00-5:00 p.m.)    Dermatology Clinic  [phone]

## 2024-03-13 ENCOUNTER — OFFICE VISIT (OUTPATIENT)
Dept: DERMATOLOGY | Facility: CLINIC | Age: 79
End: 2024-03-13
Payer: COMMERCIAL

## 2024-03-13 VITALS — HEART RATE: 64 BPM | DIASTOLIC BLOOD PRESSURE: 81 MMHG | SYSTOLIC BLOOD PRESSURE: 124 MMHG

## 2024-03-13 DIAGNOSIS — C44.329 SQUAMOUS CELL CANCER OF SKIN OF RIGHT CHEEK: ICD-10-CM

## 2024-03-13 DIAGNOSIS — C44.329 SQUAMOUS CELL CANCER OF SKIN OF LEFT CHEEK: Primary | ICD-10-CM

## 2024-03-13 PROCEDURE — 13132 CMPLX RPR F/C/C/M/N/AX/G/H/F: CPT | Mod: GC | Performed by: DERMATOLOGY

## 2024-03-13 PROCEDURE — 17312 MOHS ADDL STAGE: CPT | Mod: GC | Performed by: DERMATOLOGY

## 2024-03-13 PROCEDURE — 17311 MOHS 1 STAGE H/N/HF/G: CPT | Mod: GC | Performed by: DERMATOLOGY

## 2024-03-13 PROCEDURE — 12052 INTMD RPR FACE/MM 2.6-5.0 CM: CPT | Mod: GC | Performed by: DERMATOLOGY

## 2024-03-13 ASSESSMENT — PAIN SCALES - GENERAL: PAINLEVEL: NO PAIN (0)

## 2024-03-13 NOTE — PROGRESS NOTES
[facility] [place] Procedure Note      Date of Service:  Mar 13, 2024  Surgery: Mohs micrographic surgery    Case 1  Repair Type: Complex  Repair Size: 4.6 cm  Suture Material: 4-0 Monocryl / 5-0 Fast Absorbing Gut   Tumor Type: SCC - Squamous Cell Carcinoma  Location: L Face (left infraorbital cheek)  Derm-Path Accession #: [id number]   PreOp Size: 1.2 x 0.8 cm  PostOp Size: 2.5 x 2.0 cm  Mohs Accession #:   Level of Defect: fat      Procedure:  We discussed the principles of treatment and most likely complications including scarring, bleeding, infection, swelling, pain, crusting, nerve damage, large wound,  incomplete excision, wound dehiscence,  nerve damage, recurrence, and a second procedure may be recommended to obtain the best cosmetic or functional result.    Informed consent was obtained and the patient underwent the procedure as follows:  The patient was placed supine on the operating table.  The cancer was identified, outlined with a marker, and verified by the patient.  The entire surgical field was prepped with Hibiclens.  The surgical site was anesthetized using Lidocaine 1% with epi 1 : 100,000.      The area of clinically apparent tumor was not debulked. The layer of tissue was then surgically excised using a #15 blade and was then transferred onto a specimen sheet maintaining the orientation of the specimen. Hemostasis was obtained using monopolar electrocoagulation. The wound site was then covered with a dressing while the tissue samples were processed for examination.    The excised tissue was transported to the Mohs histology laboratory maintaining the tissue orientation.  The tissue specimen was relaxed so that the entire surgical margin was in a single horizontal plane for sectioning and inked for precise mapping.  A precise reference map was drawn to reflect the sectioning of the specimen, colored inking of the margins, and orientation on the  patient. The tissue was processed using horizontal sectioning of the base and continuous peripheral margins.  The histopathologic sections were reviewed in conjunction with the reference map.    Total blocks: 1    Total slides:  2    There were no cancer cells visualized on examination, therefore Mohs surgery was complete.     Reconstruction: Complex Linear Closure    Due to one or more of the following factors, complex linear closure was required/indicated: Extensive undermining (equal to or greater than the maximum perpendicular width of the defect), exposure of underlying structure (bone, cartilage, tendon, named neurovascular), free margin involvement (helical rim, vermillion border, nostril rim), and/or placement of retention sutures.     The patient was taken to the operative suite and placed supine on the operating room table.  The defect was identified.  Appropriate markings were made with a marking pen to plan the repair.  The area was infiltrated with Lidocaine 1% with epi 1:100,000 and prepped with Hibiclens and draped with sterile towels.     The wound was debeveled and undermined widely.  Cones were excised within relaxed skin tension lines on both sides of the defect.  Hemostasis was obtained using electrocoagulation. Subcutaneous tissues were then approximated using 4-0 Monocryl buried vertical mattress sutures.  The wound edges were then approximated additional 4-0 Monocryl buried sutures were placed in a similar fashion where needed.  Percutaneous simple running 5-0 fast absorbing gut sutures were carefully placed for maximum eversion and meticulous approximation.    Repair Size: 4.6 cm    The wound was cleansed with saline and ointment was applied along the wound surface.     A sterile pressure dressing was applied.  Wound care instructions were given verbally and in writing.  The patient left the operating suite in stable condition.      The attending surgeon was present for key procedure and  always immediately available.      [facility] [place] Procedure Note      Date of Service:  Mar 13, 2024  Surgery: Mohs micrographic surgery    Case 2  Repair Type: Intermediate  Repair Size: 4.8 cm  Suture Material: 4-0 Monocryl / 5-0 Fast Absorbing Gut   Tumor Type: SCC - Squamous Cell Carcinoma  Location: R Face (right infraauricular cheek)  Derm-Path Accession #: [id number]   PreOp Size: 0.8 x 0.7 cm  PostOp Size: 2.2 x 2.0 cm  Mohs Accession #:   Level of Defect: Fat      Procedure:  We discussed the principles of treatment and most likely complications including scarring, bleeding, infection, swelling, pain, crusting, nerve damage, large wound,  incomplete excision, wound dehiscence,  nerve damage, recurrence, and a second procedure may be recommended to obtain the best cosmetic or functional result.    Informed consent was obtained and the patient underwent the procedure as follows:  The patient was placed supine on the operating table.  The cancer was identified, outlined with a marker, and verified by the patient.  The entire surgical field was prepped with Hibiclens.  The surgical site was anesthetized using Lidocaine 1% with epi 1 : 100,000.      The area of clinically apparent tumor was not debulked. The layer of tissue was then surgically excised using a #15 blade and was then transferred onto a specimen sheet maintaining the orientation of the specimen. Hemostasis was obtained using monopolar electrocoagulation. The wound site was then covered with a dressing while the tissue samples were processed for examination.    The excised tissue was transported to the Mohs histology laboratory maintaining the tissue orientation.  The tissue specimen was relaxed so that the entire surgical margin was in a single horizontal plane for sectioning and inked for precise mapping.  A precise reference map was drawn to reflect the sectioning of the specimen, colored inking of  the margins, and orientation on the patient.  The tissue was processed using horizontal sectioning of the base and continuous peripheral margins.  The histopathologic sections were reviewed in conjunction with the reference map.    Total blocks: 1    Total slides:  2    Residual tumor was identified and indicated in red on the reference map, identifying the location where further tissue excision was necessary. Therefore, an additional stage of Mohs Micrographic surgery was deemed necessary.     Stage II   The patient was returned to the operating room, and the area prepped in the usual manner. The residual tumor was excised using the reference map as a guide. The specimen was transferred to a labeled specimen sheet maintaining the orientation of the specimen. Hemostasis was obtained and the wound site was covered with a dressing while the tissue was processed for examination.     The excised tissue was transported to the Mohs histology laboratory maintaining orientation. The specimen margins were inked for precise mapping and a reference map was prepared for the is additional stage to maintain precise orientation as described above. The tissue was processed using horizontal sectioning of the base and continuous peripheral margins. The histopathologic sections were reviewed in conjunction with the reference map.     Total blocks: 1    Total slides:  2    There were no cancer cells visualized on examination, therefore Mohs surgery was complete.     Reconstruction: Intermediate Linear Closure      The patient was taken to the operative suite and placed supine on the operating room table.  The defect was identified.  Appropriate markings were made with a marking pen to plan the repair.  The area was infiltrated with Lidocaine 1% with epi 1:100,000 and prepped with Hibiclens and draped with sterile towels.     The wound was debeveled and undermined widely.  Cones were excised within relaxed skin tension lines on both  sides of the defect.  Hemostasis was obtained using electrofulguration.  Subcutaneous tissues were then approximated using 4.0 Monocryl buried vertical mattress sutures.  The wound edges were then approximated additional  buried sutures were placed in a similar fashion where needed.  Percutaneous simple running 5.0 FAG sutures were carefully placed for maximum eversion and meticulous approximation.    Repair Size: 4.8 cm    The wound was cleansed with saline and ointment was applied along the wound surface.     A sterile pressure dressing was applied.  Wound care instructions were given verbally and in writing.  The patient left the operating suite in stable condition.     The attending surgeon was present for key procedure and always immediately available.    Dr. Joseph Hodges performed the mohs micrographic surgery. Dr. Anamaria Gomes (Mohs micrographic surgery fellow) performed the reconstruction under the direct supervision of Joseph Hodges MD, who was present for the entire micrographic surgery and key portions of the reconstruction, and always immediately available.    Suture removal: N/A (all dissolving sutures used)    Follow-up with dermatologic surgery: 2 week wound check, 3 month scar eval    Anamaria Gomes MD  Micrographic Surgery and Dermatologic Oncology (MSDO) Fellow, PGY-5    Staff Involved:   Scribe/Fellow/Staff    Scribe Disclosure:   I, ARIAS BELTRÁN, am serving as a scribe; to document services personally performed by Joseph Hodges MD -based on data collection and the provider's statements to me.       Attending attestation:  I was present for key elements of the procedure and immediately available for all other portions of the procedure.  I have reviewed the note and edited it as necessary.    Joseph Hodges M.D.  Professor  Director of Dermatologic Surgery  Department of Dermatology  [facility]    Dermatology Surgery Clinic  [facility] [place]  [address], [place] [address]

## 2024-03-13 NOTE — LETTER
3/13/2024       RE: Mike Santiago  [address] [place] [facility] [address]     Dear Colleague,    Thank you for referring your patient, Mike Santiago, to the [facility] DERMATOLOGIC SURGERY CLINIC [place] at [facility]. Please see a copy of my visit note below.    [facility] Dermatologic Surgery Clinic [place] Procedure Note      Date of Service:  Mar 13, 2024  Surgery: Mohs micrographic surgery    Case 1  Repair Type: Complex  Repair Size: 4.6 cm  Suture Material: 4-0 Monocryl / 5-0 Fast Absorbing Gut   Tumor Type: SCC - Squamous Cell Carcinoma  Location: L Face (left infraorbital cheek)  Derm-Path Accession #: [id number]   PreOp Size: 1.2 x 0.8 cm  PostOp Size: 2.5 x 2.0 cm  Mohs Accession #:   Level of Defect: fat      Procedure:  We discussed the principles of treatment and most likely complications including scarring, bleeding, infection, swelling, pain, crusting, nerve damage, large wound,  incomplete excision, wound dehiscence,  nerve damage, recurrence, and a second procedure may be recommended to obtain the best cosmetic or functional result.    Informed consent was obtained and the patient underwent the procedure as follows:  The patient was placed supine on the operating table.  The cancer was identified, outlined with a marker, and verified by the patient.  The entire surgical field was prepped with Hibiclens.  The surgical site was anesthetized using Lidocaine 1% with epi 1 : 100,000.      The area of clinically apparent tumor was not debulked. The layer of tissue was then surgically excised using a #15 blade and was then transferred onto a specimen sheet maintaining the orientation of the specimen. Hemostasis was obtained using monopolar electrocoagulation. The wound site was then covered with a dressing while the tissue samples were processed for examination.    The excised tissue was transported to the Mohs histology  laboratory maintaining the tissue orientation.  The tissue specimen was relaxed so that the entire surgical margin was in a single horizontal plane for sectioning and inked for precise mapping.  A precise reference map was drawn to reflect the sectioning of the specimen, colored inking of the margins, and orientation on the patient. The tissue was processed using horizontal sectioning of the base and continuous peripheral margins.  The histopathologic sections were reviewed in conjunction with the reference map.    Total blocks: 1    Total slides:  2    There were no cancer cells visualized on examination, therefore Mohs surgery was complete.     Reconstruction: Complex Linear Closure    Due to one or more of the following factors, complex linear closure was required/indicated: Extensive undermining (equal to or greater than the maximum perpendicular width of the defect), exposure of underlying structure (bone, cartilage, tendon, named neurovascular), free margin involvement (helical rim, vermillion border, nostril rim), and/or placement of retention sutures.     The patient was taken to the operative suite and placed supine on the operating room table.  The defect was identified.  Appropriate markings were made with a marking pen to plan the repair.  The area was infiltrated with Lidocaine 1% with epi 1:100,000 and prepped with Hibiclens and draped with sterile towels.     The wound was debeveled and undermined widely.  Cones were excised within relaxed skin tension lines on both sides of the defect.  Hemostasis was obtained using electrocoagulation. Subcutaneous tissues were then approximated using 4-0 Monocryl buried vertical mattress sutures.  The wound edges were then approximated additional 4-0 Monocryl buried sutures were placed in a similar fashion where needed.  Percutaneous simple running 5-0 fast absorbing gut sutures were carefully placed for maximum eversion and meticulous approximation.    Repair Size:  4.6 cm    The wound was cleansed with saline and ointment was applied along the wound surface.     A sterile pressure dressing was applied.  Wound care instructions were given verbally and in writing.  The patient left the operating suite in stable condition.      The attending surgeon was present for key procedure and always immediately available.      [facility] Dermatologic Surgery Clinic [place] Procedure Note      Date of Service:  Mar 13, 2024  Surgery: Mohs micrographic surgery    Case 2  Repair Type: Intermediate  Repair Size: 4.8 cm  Suture Material: 4-0 Monocryl / 5-0 Fast Absorbing Gut   Tumor Type: SCC - Squamous Cell Carcinoma  Location: R Face (right infraauricular cheek)  Derm-Path Accession #: [id number]   PreOp Size: 0.8 x 0.7 cm  PostOp Size: 2.2 x 2.0 cm  Mohs Accession #:   Level of Defect: Fat      Procedure:  We discussed the principles of treatment and most likely complications including scarring, bleeding, infection, swelling, pain, crusting, nerve damage, large wound,  incomplete excision, wound dehiscence,  nerve damage, recurrence, and a second procedure may be recommended to obtain the best cosmetic or functional result.    Informed consent was obtained and the patient underwent the procedure as follows:  The patient was placed supine on the operating table.  The cancer was identified, outlined with a marker, and verified by the patient.  The entire surgical field was prepped with Hibiclens.  The surgical site was anesthetized using Lidocaine 1% with epi 1 : 100,000.      The area of clinically apparent tumor was not debulked. The layer of tissue was then surgically excised using a #15 blade and was then transferred onto a specimen sheet maintaining the orientation of the specimen. Hemostasis was obtained using monopolar electrocoagulation. The wound site was then covered with a dressing while the tissue samples were processed for examination.    The excised tissue was  transported to the Mohs histology laboratory maintaining the tissue orientation.  The tissue specimen was relaxed so that the entire surgical margin was in a single horizontal plane for sectioning and inked for precise mapping.  A precise reference map was drawn to reflect the sectioning of the specimen, colored inking of the margins, and orientation on the patient.  The tissue was processed using horizontal sectioning of the base and continuous peripheral margins.  The histopathologic sections were reviewed in conjunction with the reference map.    Total blocks: 1    Total slides:  2    Residual tumor was identified and indicated in red on the reference map, identifying the location where further tissue excision was necessary. Therefore, an additional stage of Mohs Micrographic surgery was deemed necessary.     Stage II   The patient was returned to the operating room, and the area prepped in the usual manner. The residual tumor was excised using the reference map as a guide. The specimen was transferred to a labeled specimen sheet maintaining the orientation of the specimen. Hemostasis was obtained and the wound site was covered with a dressing while the tissue was processed for examination.     The excised tissue was transported to the Mohs histology laboratory maintaining orientation. The specimen margins were inked for precise mapping and a reference map was prepared for the is additional stage to maintain precise orientation as described above. The tissue was processed using horizontal sectioning of the base and continuous peripheral margins. The histopathologic sections were reviewed in conjunction with the reference map.     Total blocks: 1    Total slides:  2    There were no cancer cells visualized on examination, therefore Mohs surgery was complete.     Reconstruction: Intermediate Linear Closure      The patient was taken to the operative suite and placed supine on the operating room table.  The defect  was identified.  Appropriate markings were made with a marking pen to plan the repair.  The area was infiltrated with Lidocaine 1% with epi 1:100,000 and prepped with Hibiclens and draped with sterile towels.     The wound was debeveled and undermined widely.  Cones were excised within relaxed skin tension lines on both sides of the defect.  Hemostasis was obtained using electrofulguration.  Subcutaneous tissues were then approximated using 4.0 Monocryl buried vertical mattress sutures.  The wound edges were then approximated additional  buried sutures were placed in a similar fashion where needed.  Percutaneous simple running 5.0 FAG sutures were carefully placed for maximum eversion and meticulous approximation.    Repair Size: 4.8 cm    The wound was cleansed with saline and ointment was applied along the wound surface.     A sterile pressure dressing was applied.  Wound care instructions were given verbally and in writing.  The patient left the operating suite in stable condition.     The attending surgeon was present for key procedure and always immediately available.    Dr. Joseph Hodges performed the mohs micrographic surgery. Dr. Anamaria Gomes (Mohs micrographic surgery fellow) performed the reconstruction under the direct supervision of Joseph Hodges MD, who was present for the entire micrographic surgery and key portions of the reconstruction, and always immediately available.    Suture removal: N/A (all dissolving sutures used)    Follow-up with dermatologic surgery: 2 week wound check, 3 month scar eval    Anamaria Gomes MD  Micrographic Surgery and Dermatologic Oncology (MSDO) Fellow, PGY-5    Staff Involved:   Scribe/Fellow/Staff    Scribe Disclosure:   I, ARIAS BELTRÁN, am serving as a scribe; to document services personally performed by Joseph Hodges MD -based on data collection and the provider's statements to me.       Attending attestation:  I was present for key elements of the procedure and  immediately available for all other portions of the procedure.  I have reviewed the note and edited it as necessary.    Joseph Hodges M.D.  Professor  Director of Dermatologic Surgery  Department of Dermatology  [facility]    Dermatology Surgery Clinic  [place] [id number]

## 2024-03-13 NOTE — PATIENT INSTRUCTIONS
Wound care    I will experience scar, bleeding, swelling, pain, crusting and redness. I may experience incomplete removal or recurrence. Risks are bleeding, bruising, swelling, infection, nerve damage, & a large wound. A second procedure may be recommended to obtain the best cosmetic or functional result.       A three month office visit with your Surgeon is recommended for scar evaluation. Please reach out sooner if you have concerns about you surgical site/wound.    Caring for your skin after surgery    After your surgery, a pressure bandage will be placed over the area that has stitches. This is important to prevent bleeding. Please follow these instructions over the next 1 to 2 weeks. Following this regimen will help to prevent complications as your wound heals.     For the first 48 hours after your surgery:    Leave the pressure dressing on and keep it dry. If it should come loose, you may re-tape it, but do not take it off.  Relax and take it easy. Do not do any vigorous exercise or heavy lifting. This could cause the wound to bleed.  Post-Operative pain is usually mild. If you are able to take tylenol, You may take plain or extra-strength Tylenol (acetaminophen) As directed on the bottle (do not take more than 4,000mg in one day). If you are able to take ibuprofen, you can alternate the tylenol and ibuprofen.   Avoid alcohol as this may increase your tendency to bleed.   You may put an ice pack around the bandaged area for 20 minutes at a time as needed. This may help reduce swelling, bruising, and pain. Make sure the ice pack is waterproof so that the pressure bandage doesn t get wet.  If the wound is on the face try to sleep with your head elevated. Either in a recliner or propped up in bed, this will decrease swelling around the eyes.   You may see a small amount of drainage or blood on your pressure bandage. This is normal. However:  If drainage or bleeding continues or saturates the bandage, you will  "need to apply firm pressure over the bandage with a piece of gauze for 15 minutes.  If bleeding continues after applying pressure for 15 minutes, apply an ice pack to the bandaged area for 15 minutes.  If bleeding still continues, call our office or go to the nearest emergency room.    Remove pressure dressing 48 hours after surgery:    Carefully remove the pressure bandage. If it seems sticky or too difficult to get off, you may need to soak it off in the shower.  After the pressure dressing is removed, you may shower and get the wound wet. However, Do Not let the forceful stream of the shower hit the wound directly.  Follow these wound care and dressing change instructions:   In the shower wash the surgical site last with its own separate wash cloth.  You may allow water to run over the site. Take a clean wash cloth wet with soapy warm water and gently pat the suture site to help remove any crust or drainage.   Do Not rub or scrub the site    After site is clean pat dry and apply a thin layer of Vaseline ointment  over the suture site with a cotton swab or clean finger.   Cover the suture site with Telfa (non-stick) dressing. You may tape a piece of gauze over the Telfa for extra protection if you wish.  Continue wound care at least once a day, twice if you are active or around a contaminated environment.  Continue daily wound care until your surgical site is completely healed.   Dissolving stitches, if you have been told your stitches are dissolving they should dissolve in one to one and a half week.      If you are able to take acetaminophen (\"Tylenol,\" etc.) and ibuprofen (\"Advil\" or \"Motrin,\" etc.), then you may STAGGER these medications by taking 400 mg of ibuprofen (usually two tabs) every 8 hours and 1,000 mg of acetaminophen (e.g., two tabs of extra-strength Tylenol) every 8 hours.    This means, for example, that you could take the followin,000 mg of Tylenol, followed 4 hours later by 400 mg " Ibuprofen, followed 4 hours later with 1,000 mg of Tylenol, followed 4 hours later by 400 mg Ibuprofen, followed 4 hours later with 1,000 mg of Tylenol, and so forth.     Essentially, you can take either 1,000 mg of Tylenol or 400 mg of ibuprofen in alternating fashion EVERY FOUR HOURS.    Do NOT exceed more than 4,000 mg of Tylenol or 3,200 mg of ibuprofen per 24 hours. If you are not able to take Tylenol or ibuprofen as above due to other health issues (or a physician has told you directly that you are not allowed to take one of them, say due to pre-existing severe liver or kidney issues), then disregard the above directions.    Scientific evidence supports that this combination/schedule of pain medications is just as effective, if not more effective, than taking a narcotic pain medicine.       Follow up will be a 3 month scar evaluation either in person or via a telephone visit with you sending in a photo via S[employer]. Unless you have been told to follow up sooner or if you have concerns and would like to be see sooner. Please call or send us in a S[employer] message if possible and attach a photo.        What to expect:    The first couple of days your wound may be tender and may bleed slightly when doing wound care.  There may be swelling and bruising around the wound, especially if it is near the eyes. For your comfort, you may apply ice or cold compresses to the bruises after your have removed the pressure bandage.  The area around your wound may be numb for several weeks or even months.  You may experience periodic sharp pain or mild itching around the wound as it heals.   The suture line will look dark for a while but will lighten over time.       When to call us:    You have bleeding that will not stop after applying pressure and ice.  You have pain that is not controlled with Tylenol (acetaminophen.)  You have signs or symptoms of an infection such as:  Fever over 100 degrees Fahrenheit  Redness, warmth or  foul-smelling drainage from the wound  If you have any questions, or are not sure how to take care of the wound.    Phone numbers:    During business hours (M-F 8:00-4:30 p.m.)    [facility]- [phone] (Option 1 appt. Ask the call representative for the Dermatology Surgery Team)    For Dermatology Surgery scheduling please call Tracey at [phone]    ---------------------------------------------------------  Evenings/Weekends/Holidays    Hospital - [phone] (Ask for the dermatology resident on call. They will connect with the surgery Fellow)  TTY for hearing dtpeppok-[phone]  *Ask  to page dermatologist on-call  Emergency Vcos-[phone]  TTY for hearing impaired- [phone]

## 2024-03-14 ENCOUNTER — TELEPHONE (OUTPATIENT)
Dept: DERMATOLOGY | Facility: CLINIC | Age: 79
End: 2024-03-14
Payer: COMMERCIAL

## 2024-03-14 NOTE — TELEPHONE ENCOUNTER
Follow up call attempted & left voicemail following Mohs procedure with Dr. Hodges.       Are you having pain?   Are you taking pain medication?   Are you applying ice?    Have you had any noticeable bleeding through the bandage?    Do you have any other concerns?        Please call [phone] if you have any questions or concerns.

## 2024-03-18 ENCOUNTER — TELEPHONE (OUTPATIENT)
Dept: DERMATOLOGY | Facility: CLINIC | Age: 79
End: 2024-03-18
Payer: COMMERCIAL

## 2024-03-18 NOTE — TELEPHONE ENCOUNTER
Mike called in with questions about wound care and how long he should continue bandaging.     Mike missed Dominique's call last week to follow up.     I told Mike that Dominique or one of the other nurses would give him a call back to discuss.     Ph# [phone] is best for Mike.     Tracey Garza, Procedure  3/18/2024 8:59 AM

## 2024-03-18 NOTE — TELEPHONE ENCOUNTER
Writer called patient back regarding his questions about wound care for his two sites. I informed Mike that he should continue cleaning and bandaging the site for 2 weeks post-Mohs, which would place him at March 27th, 2024. Patient acknowledged.    Mike also informed me he had quite a bit of bleeding from the eye Mohs site Wednesday night (night of surgery) and that he held pressure down on the site with ice and a towel and by Thursday morning it had stopped and has since been better. Patient has no other concerns at this time.    Nallely OSBORNE RN

## 2024-04-01 ENCOUNTER — OFFICE VISIT (OUTPATIENT)
Dept: DERMATOLOGY | Facility: CLINIC | Age: 79
End: 2024-04-01
Payer: COMMERCIAL

## 2024-04-01 DIAGNOSIS — Z48.89 ENCOUNTER FOR POSTOPERATIVE WOUND CHECK: Primary | ICD-10-CM

## 2024-04-01 DIAGNOSIS — Z85.828 HISTORY OF NONMELANOMA SKIN CANCER: ICD-10-CM

## 2024-04-01 DIAGNOSIS — L57.0 ACTINIC KERATOSIS: ICD-10-CM

## 2024-04-01 PROCEDURE — 99213 OFFICE O/P EST LOW 20 MIN: CPT | Mod: GC | Performed by: DERMATOLOGY

## 2024-04-01 RX ORDER — CALCIPOTRIENE 50 UG/G
CREAM TOPICAL
Qty: 60 G | Refills: 0 | Status: SHIPPED | OUTPATIENT
Start: 2024-04-01

## 2024-04-01 RX ORDER — FLUOROURACIL 50 MG/G
CREAM TOPICAL
Qty: 40 G | Refills: 0 | Status: SHIPPED | OUTPATIENT
Start: 2024-04-01

## 2024-04-01 ASSESSMENT — PAIN SCALES - GENERAL: PAINLEVEL: NO PAIN (0)

## 2024-04-01 NOTE — PATIENT INSTRUCTIONS
Efudex and Calcipotriene Therapy Instructions:  - mix a small amount of both efudex (chemotherapy cream) and calcipotriene (vitamin D cream) creams together and apply a thin layer twice a day to cheeks, temples, and nose for 4-10 days (stop using the creams when you become irritated)  - read instructions below    Efudex (Fluorouracil) Treatment  Today, you are being prescribed Fluorouracil (Efudex) which is a topical cream used for the treatment of Actinic Keratosis which are precancers.  The medication is working to eliminate the unhealthy cells.   In the weeks after you use these creams, your skin will become red and irritated. The skin will heal after a few weeks. This treatment may be unattractive and somewhat uncomfortable so don't not use within 4 weeks before a special event.  You will want to stop any other creams such as glycolic acid products, retin A, Tazorac, etc. to the area. You may use bland makeup/cover-up as long as it doesn't sting or cause you discomfort.  Wash your hands well after you apply this medicine.   Keep the Efudex (fluorouracil) cream away from pets because it is toxic to pets. Do not let pets lick areas where you have applied the cream.   We recommend avoiding excessive sun exposure to the treated area  You may use moisturizing creams over bothersome areas such as Vanicream or Cetaphil cream if the reaction becomes too bothersome. Please, call the clinic if this occurs.     Potential Side Effects  Your treated areas may be unsightly during therapy.  This will improve slowly following the discontinuation of therapy.   During the first week of application, mild inflammation may occur.   During the following weeks, redness, and swelling may occur with some crusting and burning.   Lesions resolve as the skin exfoliates.   Over 1 to 2 weeks, new skin grows into the treatment area.    Specific side effects that usually do not require medical attention (report to your doctor or health care  professional if they continue or are bothersome) include: Red or dark-colored skin   Mild erosion (loss of upper layer of skin)   Mild eye irritation including burning, itching, sensitivity, stinging, or watering   Increased sensitivity of the skin to sun and ultraviolet light   Pain and burning of the affected area   Dryness, scaling or swelling of the affected area   Skin rash, itching of the affected area   Tenderness     If you have severe pain, please, call the clinic immediately and indicate that you have pain. Ask for a call from the RN.    Who should I call with questions?   [facility]: [phone]   [facility]: [phone]   For urgent needs outside of business hours call the [facility] at [phone]  and ask for the dermatology resident on call

## 2024-04-01 NOTE — PROGRESS NOTES
Dermatologic Surgery Post-Op Wound Check     CC: Follow Up (Patient is here today for a follow up)      Dermatology Problem List:    History of NMSC  - SCC, right infraauricular cheek,  s/p shave 1/23/24, s/p MMS 3/13/24 (intermediate)  - SCC, left infraorbital cheek, s/p shave 12/19/23, s/p shave 1/23/24, s/p MMS 3/13/24 (complex)   2. Aks  - planning 5FU/C BID to cheeks, temples, nose Spring 2024    Subjective: Mike Santiago is a 78 year old male who presents today for wound check after MMS with intermediate repair was performed for SCC on his right infraauricular cheek and MMS with intermediate repair was performed for SCC on his left infraorbital cheek.     - He experienced bleeding from the wound site the night of the procedure and it continued throughout the night. However, with compression, it stopped by morning. Today he has some bruising around the wound site.     - Wondering if he can get a cream treatment for the precancer spots on his face. Gen derm follow-up not until Fall 2024.     - no other concerns today    Objective: An exam of the right and left face was performed today     - The surgical site noted above is clean, dry, and intact. There is no surrounding erythema, purulence, or significant tenderness to palpation. No clinical evidence of infection noted today. L cheek with purple subcutaneous nodule at inferior aspect of left cheek incision.   - Gritty pink papules on left and right cheeks and temples and nasal dorsum             Assessment and Plan:     1. SCC, right face (right infraauricular cheek), s/p shave 1/23/24, s/p MMS 3/13/24 (intermediate)  - The patient's surgery site(s) is/are healing very well. No evidence of infection on examination today.    2. SCC, left face (left infraorbital cheek), s/p shave 12/19/23, s/p shave 1/23/24, s/p MMS 3/13/24 (complex)   - The patient's surgery site(s) is/are healing very well but does look like he has a small stable hematoma at inferior aspect of  suture line. No evidence of infection on examination today. Offered hematoma drainage today but patient opted to hold off. Encouraged patient to reach out if dark firm area at bottom of scar doesn't resolve in the next few weeks or becomes bothersome     3. Actinic Keratoses  Patient interested in cream treatment prior to his next gen derm follow-up in Fall 2024.   - Begin fluorouracil cream 5% and calcipotriene 0.005% cream BID for 4-10 days to cheeks, temples, and nose. Discussed instructions for use and possible adverse effects.     The patient should follow up with dermatologic surgery PRN, as well as continue with regular skin exams in general dermatology clinic.    Patient was discussed with and evaluated by attending physician Dr. Joseph Hodges.    Anamaria Gomes MD  Micrographic Surgery and Dermatologic Oncology (MSDO) Fellow, PGY-5    Staff Involved:   Scribe/Fellow/Staff    Scribe Disclosure:   I, ARIAS BELTRÁN, am serving as a scribe; to document services personally performed by Joseph Hodges MD -based on data collection and the provider's statements to me.     Attending Attestation  I attest that I discussed the case with the Fellow.  I agree with the plan.   I have reviewed the note and edited it as necessary.    Joseph Hodges M.D.  Professor  Director of Dermatologic Surgery  Department of Dermatology  [facility]

## 2024-04-01 NOTE — LETTER
4/1/2024       RE: Mike Santiago  [address]   [place] [address]     Dear Colleague,    Thank you for referring your patient, Mike Santiago, to the [facility] DERMATOLOG[facility]IC [place] at [facility]. Please see a copy of my visit note below.    Dermatologic Surgery Post-Op Wound Check     CC: Follow Up (Patient is here today for a follow up)      Dermatology Problem List:    History of NMSC  - SCC, right infraauricular cheek,  s/p shave 1/23/24, s/p MMS 3/13/24 (intermediate)  - SCC, left infraorbital cheek, s/p shave 12/19/23, s/p shave 1/23/24, s/p MMS 3/13/24 (complex)   2. Aks  - planning 5FU/C BID to cheeks, temples, nose Spring 2024    Subjective: Mike Santiago is a 78 year old male who presents today for wound check after MMS with intermediate repair was performed for SCC on his right infraauricular cheek and MMS with intermediate repair was performed for SCC on his left infraorbital cheek.     - He experienced bleeding from the wound site the night of the procedure and it continued throughout the night. However, with compression, it stopped by morning. Today he has some bruising around the wound site.     - Wondering if he can get a cream treatment for the precancer spots on his face. Gen derm follow-up not until Fall 2024.     - no other concerns today    Objective: An exam of the right and left face was performed today     - The surgical site noted above is clean, dry, and intact. There is no surrounding erythema, purulence, or significant tenderness to palpation. No clinical evidence of infection noted today. L cheek with purple subcutaneous nodule at inferior aspect of left cheek incision.   - Gritty pink papules on left and right cheeks and temples and nasal dorsum             Assessment and Plan:     1. SCC, right face (right infraauricular cheek), s/p shave 1/23/24, s/p MMS 3/13/24 (intermediate)  - The patient's  surgery site(s) is/are healing very well. No evidence of infection on examination today.    2. SCC, left face (left infraorbital cheek), s/p shave 12/19/23, s/p shave 1/23/24, s/p MMS 3/13/24 (complex)   - The patient's surgery site(s) is/are healing very well but does look like he has a small stable hematoma at inferior aspect of suture line. No evidence of infection on examination today. Offered hematoma drainage today but patient opted to hold off. Encouraged patient to reach out if dark firm area at bottom of scar doesn't resolve in the next few weeks or becomes bothersome     3. Actinic Keratoses  Patient interested in cream treatment prior to his next gen derm follow-up in Fall 2024.   - Begin fluorouracil cream 5% and calcipotriene 0.005% cream BID for 4-10 days to cheeks, temples, and nose. Discussed instructions for use and possible adverse effects.     The patient should follow up with dermatologic surgery PRN, as well as continue with regular skin exams in general dermatology clinic.    Patient was discussed with and evaluated by attending physician Dr. Joseph Hodges.    Anamaria Gomes MD  Micrographic Surgery and Dermatologic Oncology (MSDO) Fellow, PGY-5    Staff Involved:   Scribe/Fellow/Staff    Scribe Disclosure:   I, ARIAS BELTRÁN, am serving as a scribe; to document services personally performed by Joseph Hodges MD -based on data collection and the provider's statements to me.     Attending Attestation  I attest that I discussed the case with the Fellow.  I agree with the plan.   I have reviewed the note and edited it as necessary.    Joseph Hodges M.D.  Professor  Director of Dermatologic Surgery  Department of Dermatology  [facility]

## 2024-04-01 NOTE — NURSING NOTE
Chief Complaint   Patient presents with    Follow Up     Patient is here today for a follow up     Lucie ODONNELL CMA

## 2024-07-09 ENCOUNTER — TRANSFERRED RECORDS (OUTPATIENT)
Dept: HEALTH INFORMATION MANAGEMENT | Facility: CLINIC | Age: 79
End: 2024-07-09
Payer: COMMERCIAL

## 2024-11-15 ENCOUNTER — MYC MEDICAL ADVICE (OUTPATIENT)
Dept: FAMILY MEDICINE | Facility: CLINIC | Age: 79
End: 2024-11-15
Payer: COMMERCIAL

## 2024-11-15 DIAGNOSIS — D68.2 FACTOR II DEFICIENCY (H): ICD-10-CM

## 2024-11-15 DIAGNOSIS — E78.5 HYPERLIPIDEMIA LDL GOAL <130: ICD-10-CM

## 2024-11-15 RX ORDER — RIVAROXABAN 20 MG/1
20 TABLET, FILM COATED ORAL DAILY
Qty: 90 TABLET | Refills: 0 | Status: SHIPPED | OUTPATIENT
Start: 2024-11-15

## 2024-11-15 RX ORDER — ROSUVASTATIN CALCIUM 10 MG/1
10 TABLET, COATED ORAL DAILY
Qty: 90 TABLET | Refills: 0 | Status: SHIPPED | OUTPATIENT
Start: 2024-11-15

## 2024-11-15 NOTE — TELEPHONE ENCOUNTER
Name from pharmacy: Xarelto Oral Tablet 20 MG          Will file in chart as: XARELTO ANTICOAGULANT 20 MG TABS tablet    Sig: Take 1 tablet (20 mg) by mouth daily    Disp: 90 tablet    Refills: 0    Start: 11/15/2024    Class: E-Prescribe    Non-formulary For: Factor II deficiency (H)    Last ordered: 1 year ago (10/31/2023) by Lavell Fink MD    Last refill: 10/7/2024    Rx #: [id number]    Anticoagulant Agents Fytdhm20/15/2024 08:17 AM   Protocol Details Normal Platelets on file in past 12 months    Creatinine Clearance greater than 50 ml/min on file in past 3 mos    Serum creatinine less than or equal to 1.4 on file in past 3 mos    GFR on file in the past 12 months and most recent GFR is normal    Recent (6 mo) or future (90 days) visit within the authorizing provider's specialty    Medication indicated for associated diagnosis    Medication is active on med list    Patient is 18 years of age or older       Name from pharmacy: Rosuvastatin Calcium Oral Tablet 10 MG         Will file in chart as: rosuvastatin (CRESTOR) 10 MG tablet    Sig: TAKE 1 TABLET BY MOUTH DAILY    Disp: 90 tablet    Refills: 0    Start: 11/15/2024    Class: E-Prescribe    Non-formulary For: Hyperlipidemia LDL goal <130    Last ordered: 1 year ago (11/8/2023) by Lavell Fink MD    Last refill: 7/21/2024    Rx #: [id number]    Antihyperlipidemic agents Shicqi81/15/2024 08:17 AM   Protocol Details LDL on file in the past 12 months          Agustín Christine, RN, MSN     Psych/Behavioral

## 2025-01-04 ENCOUNTER — HEALTH MAINTENANCE LETTER (OUTPATIENT)
Age: 80
End: 2025-01-04

## 2025-02-20 ENCOUNTER — OFFICE VISIT (OUTPATIENT)
Dept: FAMILY MEDICINE | Facility: CLINIC | Age: 80
End: 2025-02-20
Payer: COMMERCIAL

## 2025-02-20 VITALS
OXYGEN SATURATION: 97 % | DIASTOLIC BLOOD PRESSURE: 83 MMHG | HEART RATE: 63 BPM | WEIGHT: 238 LBS | RESPIRATION RATE: 18 BRPM | SYSTOLIC BLOOD PRESSURE: 159 MMHG | BODY MASS INDEX: 33.19 KG/M2

## 2025-02-20 DIAGNOSIS — D68.2 FACTOR II DEFICIENCY (H): ICD-10-CM

## 2025-02-20 DIAGNOSIS — H91.90 HOH (HARD OF HEARING): ICD-10-CM

## 2025-02-20 DIAGNOSIS — R29.6 FALLS FREQUENTLY: ICD-10-CM

## 2025-02-20 DIAGNOSIS — E78.5 HYPERLIPIDEMIA LDL GOAL <130: Primary | ICD-10-CM

## 2025-02-20 RX ORDER — ROSUVASTATIN CALCIUM 10 MG/1
10 TABLET, COATED ORAL DAILY
Qty: 90 TABLET | Refills: 3 | Status: SHIPPED | OUTPATIENT
Start: 2025-02-20

## 2025-02-20 SDOH — HEALTH STABILITY: PHYSICAL HEALTH: ON AVERAGE, HOW MANY DAYS PER WEEK DO YOU ENGAGE IN MODERATE TO STRENUOUS EXERCISE (LIKE A BRISK WALK)?: 1 DAY

## 2025-02-20 ASSESSMENT — SOCIAL DETERMINANTS OF HEALTH (SDOH): HOW OFTEN DO YOU GET TOGETHER WITH FRIENDS OR RELATIVES?: THREE TIMES A WEEK

## 2025-02-20 NOTE — ASSESSMENT & PLAN NOTE
Orders:    rivaroxaban ANTICOAGULANT (XARELTO ANTICOAGULANT) 20 MG TABS tablet; Take 1 tablet (20 mg) by mouth daily.

## 2025-02-20 NOTE — ASSESSMENT & PLAN NOTE
Orders:    rosuvastatin (CRESTOR) 10 MG tablet; Take 1 tablet (10 mg) by mouth daily.    Lipid Profile; Future     No

## 2025-03-11 ENCOUNTER — THERAPY VISIT (OUTPATIENT)
Dept: PHYSICAL THERAPY | Facility: REHABILITATION | Age: 80
End: 2025-03-11
Attending: FAMILY MEDICINE
Payer: COMMERCIAL

## 2025-03-11 DIAGNOSIS — R29.6 FALLS FREQUENTLY: Primary | ICD-10-CM

## 2025-03-11 NOTE — PROGRESS NOTES
"PHYSICAL THERAPY EVALUATION  Type of Visit: Evaluation    Subjective     Pt had one fall in December and broke a verebrae (T12). The fall was on ice back in December. His balance is also suffering, but there have been no falls since the one in Dec. He feels his balance is affected by head movements, as well. Stairs are also difficult as the client lacks confidence in balance.     No problems with driving. PMH hernia repair.     \"I want to improve my balance\"    Pt keeps busy with hiking and volunteering at a camp.     No use of cane or walker.         Presenting condition or subjective complaint: balance  Date of onset: 23    Relevant medical history:     Dates & types of surgery: hernia    Prior diagnostic imaging/testing results: Other i have not had any treatment for balence   Prior therapy history for the same diagnosis, illness or injury: No      Prior Level of Function  Transfers: Independent  Ambulation: Independent  ADL: Independent    Living Environment  Social support: With a significant other or spouse   Type of home: 2-story   Stairs to enter the home: Yes 35 Is there a railing: No     Ramp: No   Stairs inside the home: Yes 35 Is there a railing: No     Help at home: Self Cares (home health aide/personal care attendant, family, etc)  Equipment owned:       Employment: No    Hobbies/Interests:      Patient goals for therapy: improve    Pain assessment: Location: back/Ratin/10     Objective      Cognitive Status Examination  Orientation: Oriented to person, place and time   Level of Consciousness: Alert  Follows Commands and Answers Questions: 100% of the time  Personal Safety and Judgement:  falls risk  Memory: Intact    OBSERVATION: Forward head, flexed posture, reduced step length  INTEGUMENTARY: Intact  POSTURE:  forward head, flexed posture  PALPATION: n/t  RANGE OF MOTION: LE ROM WNL  STRENGTH: LE Strength WNL    BED MOBILITY:  NT    TRANSFERS: WNL    WHEELCHAIR MOBILITY: NA    GAIT: "   Level of [place]: WNL  Assistive Device(s): None  Gait Deviations:  WBOS, reduced step length  Gait Distance: within clinic  Stairs: 4 stairs without rail    BALANCE: WNL    SPECIAL TESTS  Functional Gait Assessment (FGA) TOTAL SCORE: (MAXIMUM SCORE 30): 17 17/30     Single Leg Stance Right (sec) 2 s   Single Leg Stance Left (sec) 2 s   Modified CTSIB Conditions (sec) Cond 1: 30 s  Cond 2: 30 s mild sway  Cond 4: 30 s   Cond 5 : 30 s mod sway        SENSATION:  impaired    REFLEXES: NT  COORDINATION: WNL screening  MUSCLE TONE: WNL    Assessment & Plan   CLINICAL IMPRESSIONS  Medical Diagnosis: Falls frequently (R29.6)    Treatment Diagnosis: impaired balance   Impression/Assessment: Patient is a 79 year old male with  complaints of imbalance & unsteadiness on gait, especially uneven surfaces.  The following significant findings have been identified: Decreased strength, Impaired balance, Impaired sensation, Decreased activity tolerance, and Instability. These impairments interfere with their ability to perform self care tasks, work tasks, and recreational activities as compared to previous level of function.     Clinical Decision Making (Complexity):  Clinical Presentation: Stable/Uncomplicated  Clinical Presentation Rationale: based on medical and personal factors listed in PT evaluation  Clinical Decision Making (Complexity): Low complexity    PLAN OF CARE  Treatment Interventions:    Long Term Goals     PT Goal 1  Goal Identifier: FGA  Goal Description: within 12 wks client will score at least 21/30 on the FGA in order to reduce falls risk.  Rationale: to maximize safety and independence with performance of ADLs and functional tasks;to maximize safety and independence within the home;to maximize safety and independence within the community  Target Date: 06/08/25  PT Goal 2  Goal Identifier: SLS  Goal Description: Within 12 wks the client will be able to perform SLS bilaterally for at least 10 s in order to  promote balance and function  Rationale: to maximize safety and independence with performance of ADLs and functional tasks;to maximize safety and independence within the home;to maximize safety and independence within the community  Target Date: 06/08/25  PT Goal 3  Goal Identifier: MCTSIB  Goal Description: Within 12 wks the client will complete the MCTSIB with mild sway or better in order to promote balance on uneven surfaces  Rationale: to maximize safety and independence with performance of ADLs and functional tasks  Target Date: 06/08/25      Frequency of Treatment: 1x/wk  Duration of Treatment: 12 wks    Education Assessment:   Learner/Method: Patient  Education Comments: PT POC, goals and HEP    Risks and benefits of evaluation/treatment have been explained.   Patient/Family/caregiver agrees with Plan of Care.     Evaluation Time:     PT Eval, Low Complexity Minutes (62359): 30     Signing Clinician: Suki Martinez, PT, DPT        [facility]                                                                                   OUTPATIENT PHYSICAL THERAPY      PLAN OF TREATMENT FOR OUTPATIENT REHABILITATION   Patient's Last Name, First Name, Mike Liu YOB: 1945   Provider's Name   [facility]   Medical Record No.  [id number]     Onset Date: 02/20/25  Start of Care Date: 03/11/25     Medical Diagnosis:  Falls frequently (R29.6)      PT Treatment Diagnosis:  impaired balance Plan of Treatment  Frequency/Duration: 1x/wk/ 12 wks    Certification date from 03/11/25 to 06/08/25         See note for plan of treatment details and functional goals     Suki Martinez, PT, DPT                         I CERTIFY THE NEED FOR THESE SERVICES FURNISHED UNDER        THIS PLAN OF TREATMENT AND WHILE UNDER MY CARE     (Physician attestation of this document indicates review and certification of the therapy plan).              Referring  Provider:  Lavell Fink    Initial Assessment  See Epic Evaluation- Start of Care Date: 03/11/25

## 2025-06-02 ENCOUNTER — OFFICE VISIT (OUTPATIENT)
Dept: AUDIOLOGY | Facility: CLINIC | Age: 80
End: 2025-06-02
Attending: FAMILY MEDICINE
Payer: COMMERCIAL

## 2025-06-02 DIAGNOSIS — H90.3 SENSORINEURAL HEARING LOSS, BILATERAL: Primary | ICD-10-CM

## 2025-06-02 DIAGNOSIS — H91.90 HOH (HARD OF HEARING): ICD-10-CM

## 2025-06-02 NOTE — PROGRESS NOTES
AUDIOLOGY REPORT    SUBJECTIVE: Mike Santiago is an 79 year old male who was seen in the Audiology Clinic at [facility] for audiologic evaluation, referred by Lavell Fink M.D. The patient has been seen previously in this clinic on 8/27/2019 for assessment, and results indicated mild rising to normal hearing sloping to profound unmeasurable sensorineural hearing loss for the right ear and mild sloping to severe sensorineural hearing loss for the left ear. He was fit with bilateral Signia Pure Charge and Go 3NX hearing aids at this clinic on 11/4/2019. He reports the hearing aids are not working well but forgot to bring them to today's appointment. He notes a possible decrease in hearing and stable bilateral tinnitus. The patient reports some imbalance. He denies bilateral pain, bilateral drainage, or a history of ear surgeries.     OBJECTIVE:  Falls Risk Screening  Falls Risk Completed by: Audiology  Have you fallen 2 or more times in the past year? Yes  Have you fallen and had an injury in the past year? Yes, he hurt his back after falling on the ice last winter  Is the patient receiving Physical Therapy services? No  Fall Screen Comments: The patient followed up at the time of his falls    Otoscopic exam indicated ears are clear of cerumen bilaterally     Pure Tone Thresholds assessed using conventional audiometry with good reliability from 250-8000 Hz bilaterally using insert earphones and circumaural headphones     RIGHT:  Mild sloping to profound unmeasurable sensorineural hearing loss    LEFT:    Mild sloping to severe sensorineural hearing loss    Tympanogram:    RIGHT: Hypermobile    LEFT:   Hypermobile    Reflexes (reported by stimulus ear): Could not accurately assess, as there were no repeatable tracings    Speech Reception Threshold:    RIGHT: 45 dB HL    LEFT:   50 dB HL    Word Recognition Score:     RIGHT: 72% at 90 dB HL using NU-6 recorded word  list    LEFT:   76% at 90 dB HL using NU-6 recorded word list    ASSESSMENT: Bilateral sensorineural hearing loss. Compared to the patient's previous audiogram dated 8/27/2019, thresholds have worsened by 15 dB at 500-1000 Hz for both ears. Word recognition scores are stable bilaterally. Today s results were discussed with the patient in detail.     PLAN: The patient will return to have his current hearing aids assessed and to possibly discuss new technology. Repeat audiologic evaluation is recommended if changes are noted. Please call this clinic with questions regarding these results or recommendations.      Zeus Bonilla, [facility]  Licensed Audiologist  [place] #[id number]

## 2025-06-03 ENCOUNTER — TELEPHONE (OUTPATIENT)
Dept: AUDIOLOGY | Facility: CLINIC | Age: 80
End: 2025-06-03
Payer: COMMERCIAL

## 2025-06-03 NOTE — TELEPHONE ENCOUNTER
LVM to reschedule series of appts as the HAC should have been scheduled as 90 min to account for current aids and new ones as well      Gave Audiology number

## 2025-06-04 ENCOUNTER — OFFICE VISIT (OUTPATIENT)
Dept: AUDIOLOGY | Facility: CLINIC | Age: 80
End: 2025-06-04
Payer: COMMERCIAL

## 2025-06-04 DIAGNOSIS — H90.3 SENSORINEURAL HEARING LOSS, BILATERAL: Primary | ICD-10-CM

## 2025-06-04 NOTE — PROGRESS NOTES
AUDIOLOGY REPORT    SUBJECTIVE:Mike Santiago is a 79 year old male who was seen in the Audiology Clinic at the [facility] on 6/4/2025  for a follow-up check of their hearing aids. Previous results have revealed a right ear mild sloping to profound unmeasurable sensorineural hearing loss, and a left ear mild sloping to severe sensorineural hearing loss. He was fit with bilateral Signia Pure Charge and Go 3NX hearing aids at this clinic on 11/4/2019. Mike reports that his left hearing aid only works for half the day, and that yesterday the right one stopped working.    OBJECTIVE:   Otoscopy revealed clean ear canals bilaterally. However both hearing aid filters and domes are plugged with wax. Both hearing aids are deep cleaned and filters and wax filters replaced. Right hearing aid now sounds crisp and clear. Left hearing aid is dead.Patient has appointment set up to obtain hearing aids as his are currently 6 years old. Explained the battery now dies quicker that the hearing aids are older, and we can either repair these or move forward with new hearing aids. He would like to move forward.    ASSESSMENT: A follow-up appointment for hearing aid's was completed today. Changes to hearing aid was completed as outlined above.     PLAN:Mike will return for a hearing aid consult  scheduled.Please call this clinic with any questions regarding today s appointment.        Zeus Gomez, [facility]  Licensed Audiologist  [place] #[id number]

## 2025-07-07 ENCOUNTER — TRANSFERRED RECORDS (OUTPATIENT)
Dept: HEALTH INFORMATION MANAGEMENT | Facility: CLINIC | Age: 80
End: 2025-07-07
Payer: COMMERCIAL

## 2025-07-23 ENCOUNTER — OFFICE VISIT (OUTPATIENT)
Dept: AUDIOLOGY | Facility: CLINIC | Age: 80
End: 2025-07-23
Payer: COMMERCIAL

## 2025-07-23 DIAGNOSIS — H90.3 SENSORINEURAL HEARING LOSS, BILATERAL: Primary | ICD-10-CM

## 2025-07-23 NOTE — PROGRESS NOTES
AUDIOLOGY REPORT    SUBJECTIVE: Mike Santiago is a 79 year old male was seen in the Audiology Clinic at  [facility] on 7/23/25 to discuss concerns with hearing and functional communication difficulties.  Mike has been seen previously on 06/02/2025, and results revealed a mild sloping to profound sensorineural hearing loss in the right ear, and a mild sloping to severe sensorineural hearing loss in the left ear. Mike currently wears 6 year old Signia Pure Charge and Go hearing aids. He reports they are currently not functions. A third year Audiology student was present to observe the appointment today.    OBJECTIVE:    Patient is a hearing aid candidate. Patient would like to move forward with a hearing aid evaluation today. Therefore, the patient was presented with different options for amplification to help aid in communication. Discussed styles, levels of technology and monaural vs. binaural fitting.   Spoke about a remote microphone and the benefits of using one. He reports his wife would not likely use it. Therefore we will stick with his current brand of hearing aids.    The hearing aid(s) mutually chosen were:  Binaural: Signia  COLOR: Keysha Brown  BATTERY SIZE: rechargeable  EARMOLD/TIPS: canal lock  CANAL/ LENGTH: 1  ACCESSORY: N/A    Otoscopy revealed ears are clear of cerumen bilaterally. Bilateral earmolds were taken without incident.    ASSESSMENT:   Reviewed purchase information and warranty information with patient. The 45 day trial period was explained to patient. The patient was given a copy of the [place] Department of [employer] consumer brochure on purchasing hearing instruments. Patient risk factors have been provided to the patient in writing prior to the sale of the hearing aid per FDA regulation. The risk factors are also available in the User Instructional Booklet to be presented on the day of the hearing aid fitting. Hearing aid(s) ordered. Hearing  aid evaluation completed.    PLAN: Mike is scheduled to return in -3-5 weeks for a hearing aid fitting and programming. Purchase agreement will be completed on that date. Please contact this clinic with any questions or concerns.        Angi Gomez., [facility]  Licensed Audiologist  [place] #[id number]

## 2025-07-23 NOTE — Clinical Note
Patient is getting new hearing aids. His insurance requires medical clearance. He is a long time user and no concerns today. Can you please respond to me saying he is Medically cleared for hearing aids?   Thank you Zeus Gomez, CCC-A

## 2025-08-26 ENCOUNTER — OFFICE VISIT (OUTPATIENT)
Dept: AUDIOLOGY | Facility: CLINIC | Age: 80
End: 2025-08-26
Payer: COMMERCIAL

## 2025-08-26 DIAGNOSIS — H90.3 SENSORINEURAL HEARING LOSS, BILATERAL: Primary | ICD-10-CM
